# Patient Record
Sex: FEMALE | Race: WHITE | Employment: FULL TIME | URBAN - METROPOLITAN AREA
[De-identification: names, ages, dates, MRNs, and addresses within clinical notes are randomized per-mention and may not be internally consistent; named-entity substitution may affect disease eponyms.]

---

## 2017-04-27 ENCOUNTER — HOSPITAL ENCOUNTER (OUTPATIENT)
Dept: PREADMISSION TESTING | Age: 47
Discharge: HOME | End: 2017-04-27
Attending: PODIATRIST | Admitting: PODIATRIST

## 2017-04-27 VITALS — HEIGHT: 68 IN | WEIGHT: 230 LBS | BODY MASS INDEX: 34.86 KG/M2

## 2017-04-27 RX ORDER — PROPRANOLOL HYDROCHLORIDE 10 MG/1
10 TABLET ORAL 3 TIMES DAILY
COMMUNITY
End: 2018-09-17 | Stop reason: ALTCHOICE

## 2017-04-27 RX ORDER — CHLORHEXIDINE GLUCONATE 0.12 MG/ML
15 RINSE ORAL 2 TIMES DAILY
Status: CANCELLED | OUTPATIENT
Start: 2017-04-27

## 2017-04-27 RX ORDER — OXYCODONE AND ACETAMINOPHEN 10; 325 MG/1; MG/1
1 TABLET ORAL EVERY 4 HOURS PRN
COMMUNITY
End: 2017-05-03 | Stop reason: HOSPADM

## 2017-05-03 ENCOUNTER — HOSPITAL ENCOUNTER (OUTPATIENT)
Dept: SURGERY | Age: 47
Discharge: HOME | End: 2017-05-03
Admitting: PODIATRIST

## 2017-05-03 VITALS
HEIGHT: 68 IN | BODY MASS INDEX: 34.86 KG/M2 | SYSTOLIC BLOOD PRESSURE: 126 MMHG | WEIGHT: 230 LBS | DIASTOLIC BLOOD PRESSURE: 81 MMHG | TEMPERATURE: 96.7 F | OXYGEN SATURATION: 95 % | HEART RATE: 60 BPM | RESPIRATION RATE: 16 BRPM

## 2017-05-03 DIAGNOSIS — G89.18 POST-OP PAIN: Primary | ICD-10-CM

## 2017-05-03 RX ORDER — SODIUM CHLORIDE 0.9 % (FLUSH) 0.9 %
10 SYRINGE (ML) INJECTION PRN
Status: DISCONTINUED | OUTPATIENT
Start: 2017-05-03 | End: 2017-05-04 | Stop reason: HOSPADM

## 2017-05-03 RX ORDER — MEPERIDINE HYDROCHLORIDE 25 MG/ML
12.5 INJECTION INTRAMUSCULAR; INTRAVENOUS; SUBCUTANEOUS EVERY 5 MIN PRN
Status: DISCONTINUED | OUTPATIENT
Start: 2017-05-03 | End: 2017-05-04 | Stop reason: HOSPADM

## 2017-05-03 RX ORDER — LABETALOL HYDROCHLORIDE 5 MG/ML
5 INJECTION, SOLUTION INTRAVENOUS EVERY 10 MIN PRN
Status: DISCONTINUED | OUTPATIENT
Start: 2017-05-03 | End: 2017-05-04 | Stop reason: HOSPADM

## 2017-05-03 RX ORDER — KETOROLAC TROMETHAMINE 30 MG/ML
30 INJECTION, SOLUTION INTRAMUSCULAR; INTRAVENOUS ONCE
Status: COMPLETED | OUTPATIENT
Start: 2017-05-03 | End: 2017-05-03

## 2017-05-03 RX ORDER — ONDANSETRON 2 MG/ML
4 INJECTION INTRAMUSCULAR; INTRAVENOUS
Status: ACTIVE | OUTPATIENT
Start: 2017-05-03 | End: 2017-05-03

## 2017-05-03 RX ORDER — OXYCODONE HYDROCHLORIDE 5 MG/1
10 TABLET ORAL ONCE
Status: COMPLETED | OUTPATIENT
Start: 2017-05-03 | End: 2017-05-03

## 2017-05-03 RX ORDER — FENTANYL CITRATE 50 UG/ML
100 INJECTION, SOLUTION INTRAMUSCULAR; INTRAVENOUS ONCE
Status: COMPLETED | OUTPATIENT
Start: 2017-05-03 | End: 2017-05-03

## 2017-05-03 RX ORDER — DEXAMETHASONE SODIUM PHOSPHATE 4 MG/ML
10 INJECTION, SOLUTION INTRA-ARTICULAR; INTRALESIONAL; INTRAMUSCULAR; INTRAVENOUS; SOFT TISSUE ONCE
Status: COMPLETED | OUTPATIENT
Start: 2017-05-03 | End: 2017-05-03

## 2017-05-03 RX ORDER — GLYCOPYRROLATE 0.2 MG/ML
0.1 INJECTION INTRAMUSCULAR; INTRAVENOUS ONCE
Status: COMPLETED | OUTPATIENT
Start: 2017-05-03 | End: 2017-05-03

## 2017-05-03 RX ORDER — PROMETHAZINE HYDROCHLORIDE 25 MG/1
25 TABLET ORAL EVERY 6 HOURS PRN
Qty: 20 TABLET | Refills: 0 | Status: SHIPPED | OUTPATIENT
Start: 2017-05-03 | End: 2017-05-10

## 2017-05-03 RX ORDER — SODIUM CHLORIDE 0.9 % (FLUSH) 0.9 %
10 SYRINGE (ML) INJECTION EVERY 12 HOURS SCHEDULED
Status: DISCONTINUED | OUTPATIENT
Start: 2017-05-03 | End: 2017-05-04 | Stop reason: HOSPADM

## 2017-05-03 RX ORDER — SODIUM CHLORIDE, SODIUM LACTATE, POTASSIUM CHLORIDE, CALCIUM CHLORIDE 600; 310; 30; 20 MG/100ML; MG/100ML; MG/100ML; MG/100ML
INJECTION, SOLUTION INTRAVENOUS CONTINUOUS
Status: DISCONTINUED | OUTPATIENT
Start: 2017-05-03 | End: 2017-05-04 | Stop reason: HOSPADM

## 2017-05-03 RX ORDER — MORPHINE SULFATE 2 MG/ML
2 INJECTION, SOLUTION INTRAMUSCULAR; INTRAVENOUS EVERY 5 MIN PRN
Status: DISCONTINUED | OUTPATIENT
Start: 2017-05-03 | End: 2017-05-04 | Stop reason: HOSPADM

## 2017-05-03 RX ORDER — ROPIVACAINE HYDROCHLORIDE 5 MG/ML
INJECTION, SOLUTION EPIDURAL; INFILTRATION; PERINEURAL
Status: DISPENSED
Start: 2017-05-03 | End: 2017-05-03

## 2017-05-03 RX ORDER — MIDAZOLAM HYDROCHLORIDE 1 MG/ML
2 INJECTION INTRAMUSCULAR; INTRAVENOUS
Status: COMPLETED | OUTPATIENT
Start: 2017-05-03 | End: 2017-05-03

## 2017-05-03 RX ORDER — HYDROCODONE BITARTRATE AND ACETAMINOPHEN 7.5; 325 MG/1; MG/1
1 TABLET ORAL EVERY 4 HOURS PRN
Qty: 42 TABLET | Refills: 0 | Status: SHIPPED | OUTPATIENT
Start: 2017-05-03 | End: 2017-05-10

## 2017-05-03 RX ORDER — ACETAMINOPHEN 325 MG/1
325 TABLET ORAL
Qty: 30 TABLET | Refills: 0 | Status: SHIPPED | OUTPATIENT
Start: 2017-05-03 | End: 2017-11-20

## 2017-05-03 RX ORDER — ONDANSETRON 2 MG/ML
4 INJECTION INTRAMUSCULAR; INTRAVENOUS ONCE
Status: COMPLETED | OUTPATIENT
Start: 2017-05-03 | End: 2017-05-03

## 2017-05-03 RX ORDER — METOCLOPRAMIDE HYDROCHLORIDE 5 MG/ML
10 INJECTION INTRAMUSCULAR; INTRAVENOUS ONCE
Status: COMPLETED | OUTPATIENT
Start: 2017-05-03 | End: 2017-05-03

## 2017-05-03 RX ORDER — FENTANYL CITRATE 50 UG/ML
50 INJECTION, SOLUTION INTRAMUSCULAR; INTRAVENOUS EVERY 5 MIN PRN
Status: DISCONTINUED | OUTPATIENT
Start: 2017-05-03 | End: 2017-05-04 | Stop reason: HOSPADM

## 2017-05-03 RX ADMIN — OXYCODONE HYDROCHLORIDE 10 MG: 5 TABLET ORAL at 10:32

## 2017-05-03 RX ADMIN — Medication 0.5 MG: at 10:04

## 2017-05-03 RX ADMIN — SODIUM CHLORIDE, SODIUM LACTATE, POTASSIUM CHLORIDE, CALCIUM CHLORIDE: 600; 310; 30; 20 INJECTION, SOLUTION INTRAVENOUS at 07:18

## 2017-05-03 RX ADMIN — KETOROLAC TROMETHAMINE 30 MG: 30 INJECTION, SOLUTION INTRAMUSCULAR; INTRAVENOUS at 10:06

## 2017-05-03 RX ADMIN — METOCLOPRAMIDE HYDROCHLORIDE 10 MG: 5 INJECTION INTRAMUSCULAR; INTRAVENOUS at 07:23

## 2017-05-03 RX ADMIN — MIDAZOLAM HYDROCHLORIDE 2 MG: 1 INJECTION INTRAMUSCULAR; INTRAVENOUS at 07:19

## 2017-05-03 RX ADMIN — GLYCOPYRROLATE 0.1 MG: 0.2 INJECTION INTRAMUSCULAR; INTRAVENOUS at 07:20

## 2017-05-03 RX ADMIN — ONDANSETRON 4 MG: 2 INJECTION INTRAMUSCULAR; INTRAVENOUS at 07:23

## 2017-05-03 RX ADMIN — DEXAMETHASONE SODIUM PHOSPHATE 10 MG: 4 INJECTION, SOLUTION INTRA-ARTICULAR; INTRALESIONAL; INTRAMUSCULAR; INTRAVENOUS; SOFT TISSUE at 07:19

## 2017-05-03 RX ADMIN — FENTANYL CITRATE 100 MCG: 50 INJECTION, SOLUTION INTRAMUSCULAR; INTRAVENOUS at 07:20

## 2017-05-03 ASSESSMENT — PAIN DESCRIPTION - DESCRIPTORS
DESCRIPTORS: ACHING
DESCRIPTORS: ACHING;CRUSHING;DISCOMFORT

## 2017-05-03 ASSESSMENT — PAIN SCALES - GENERAL
PAINLEVEL_OUTOF10: 4
PAINLEVEL_OUTOF10: 4
PAINLEVEL_OUTOF10: 6
PAINLEVEL_OUTOF10: 7
PAINLEVEL_OUTOF10: 4
PAINLEVEL_OUTOF10: 6

## 2017-05-03 ASSESSMENT — PAIN DESCRIPTION - PAIN TYPE: TYPE: OTHER (COMMENT)

## 2017-05-03 ASSESSMENT — PAIN DESCRIPTION - FREQUENCY: FREQUENCY: CONTINUOUS

## 2017-05-03 ASSESSMENT — PAIN DESCRIPTION - ORIENTATION: ORIENTATION: LEFT;LOWER;POSTERIOR

## 2017-05-03 ASSESSMENT — PAIN DESCRIPTION - LOCATION: LOCATION: LEG

## 2017-05-03 ASSESSMENT — PAIN - FUNCTIONAL ASSESSMENT: PAIN_FUNCTIONAL_ASSESSMENT: 0-10

## 2017-11-20 RX ORDER — SODIUM CHLORIDE, SODIUM LACTATE, POTASSIUM CHLORIDE, CALCIUM CHLORIDE 600; 310; 30; 20 MG/100ML; MG/100ML; MG/100ML; MG/100ML
INJECTION, SOLUTION INTRAVENOUS CONTINUOUS
Status: DISCONTINUED | OUTPATIENT
Start: 2017-11-20 | End: 2017-11-22 | Stop reason: HOSPADM

## 2017-11-20 RX ORDER — CELECOXIB 200 MG/1
200 CAPSULE ORAL
COMMUNITY
Start: 2017-10-24 | End: 2019-08-12 | Stop reason: ALTCHOICE

## 2017-11-20 RX ORDER — LISINOPRIL 10 MG/1
10 TABLET ORAL
COMMUNITY
Start: 2017-06-29 | End: 2018-09-17 | Stop reason: ALTCHOICE

## 2017-11-20 RX ORDER — OXYCODONE HYDROCHLORIDE AND ACETAMINOPHEN 5; 325 MG/1; MG/1
1 TABLET ORAL
COMMUNITY
Start: 2017-10-24 | End: 2019-08-12 | Stop reason: ALTCHOICE

## 2017-11-20 RX ORDER — AMITRIPTYLINE HYDROCHLORIDE 10 MG/1
10 TABLET, FILM COATED ORAL NIGHTLY
COMMUNITY
End: 2018-09-17 | Stop reason: ALTCHOICE

## 2017-11-20 NOTE — PROGRESS NOTES
[address] [employer]                          Date of Procedure 11-21-17 Time of [id number]    PRIOR TO PROCEDURE DATE:  1. Please follow any guidelines/instructions prior to your procedure as advised by your surgeon. 2. Arrange for someone to drive you home and be with you for the first 24 hours after discharge for your safety after your procedure for which you received sedation. Ensure it is someone we can share information with regarding your discharge. 3. You must contact your surgeon for instructions IF:   You are taking any blood thinners, aspirin, anti-inflammatory or vitamin E.   There is a change in your physical condition such as a cold, fever, rash, cuts, sores or any other infection, especially near your surgical site. 4. Do not drink alcohol the day before or day of your procedure. 5. A Pre-op History and Physical for surgery MUST be completed by your Physician or Urgent Care within 30 days of your procedure date. Please bring a copy with you on the day of your procedure and along with any other testing performed. THE DAY OF YOUR PROCEDURE:  1. Follow instructions for ARRIVAL TIME as DIRECTED BY YOUR SURGEON. If your surgeon does not give you a specific arrival time, please arrive at [phone].    2. Enter the MAIN entrance from [employer] and follow the signs to the free [employer] or [employer] parking (offered free of charge 6am-5pm). 3. Enter the Main Entrance of the hospital (do not enter from the lower level of the parking garage). Upon entrance, check in with the  at the main desk on your left. If no one is available at the desk, proceed into the [facility] Waiting Room and go through the door directly into the [facility]. There is a Check-in desk ACROSS from Room 5 (marked with a sign hanging from the ceiling). The phone number for the surgery center is [phone].     4. Please call [phone] option #2 option #2 if you have not been preregistered yet. On the day of your procedure bring your insurance card and photo ID. You will be registered at your bedside once brought back to your room. 5. DO NOT EAT OR DRINK ANYTHING AFTER MIDNIGHT. 6. MEDICATIONS    Take the following medications with a SMALL sip of water:  prozac   Use your usual dose of inhalers the morning of surgery. BRING your rescue inhaler with you to hospital.    Anesthesia does NOT want you to take insulin the morning of surgery. They will control your blood sugar while you are at the hospital. Please contact your ordering physician for instructions regarding your insulin the night before your procedure. If you have an insulin pump, please keep it set on basal rate. 7. Do not swallow water when brushing teeth. No gum, candy, mints or ice chips. Refrain from smoking or at least decrease the amount. 8. Dress in loose, comfortable clothing appropriate for redressing after your procedure. Do not wear jewelry (including body piercings), make-up (especially NO eye make-up), fingernail polish (NO toenail polish if foot/leg surgery), lotion, powders or metal hairclips. 9. Dentures, glasses, or contacts will need to be removed before your procedure. Bring cases for your glasses, contacts, dentures, or hearing aids to protect them while you are in surgery. 10. If you use a CPAP, please bring it with you on the day of your procedure. 11. We recommend that valuable personal  belongings, such as credit cards, cash, cell phones, e-tablets or jewelry, be left at home during your stay. The hospital will not be responsible for valuables that are not secured in the hospital safe. However, if your insurance requires a co-pay, you may want to bring a method of payment, i.e. Check or credit card, if you wish to pay your co-pay the day of surgery. 12. If you are to stay overnight, you may bring a bag with personal items.  Please have any large items you may need brought in by your family after your arrival to your hospital room. 15. If you have a Living Will or Durable Power of , please bring a copy on the day of your procedure. 15. With your permission, one family member may accompany you while you are being prepared for surgery. Once you are ready, additional family members may join you. HOW WE KEEP YOU SAFE and WORK TO PREVENT SURGICAL SITE INFECTIONS:  1. Health care workers should always check your ID bracelet to verify your name and birth date. You will be asked many times to state your name, date of birth, and allergies. 2. Health care workers should always clean their hands with soap or alcohol gel before providing care to you. It is okay to ask anyone if they cleaned their hands before they touch you. 3. You will be actively involved in verifying the type of procedure you are having and ensuring the correct surgical site. This will be confirmed multiple times prior to your procedure. Do NOT celso your surgery site UNLESS instructed to by your surgeon. 4. Do not shave or wax for 72 hours prior to procedure near your operative site. Shaving with a razor can irritate your skin and make it easier to develop an infection. On the day of your procedure, any hair that needs to be removed near the surgical site will be clipped by a healthcare worker using a special clippers designed to avoid skin irritation. 5. When you are in the operating room, your surgical site will be cleansed with a special soap, and in most cases, you will be given an antibiotic before the surgery begins. AFTER YOUR PROCEDURE:  1. For comfort and safety, arrange to have someone at home with you for the first 24 hours after discharge. 2. You and your family will be given written instructions about your diet, activity, dressing care, medications, and return visits. 3. Always clean your hands before and after caring for your wound.  Do not let your family touch your surgery site without cleaning their hands. 4. Mild nausea, headache, muscle aches, sore throat, or fatigue may occur after anesthesia. Should any of these symptoms become severe, or should you notice any signs of infection, you should call your surgeon. 5. Narcotic pain medications can cause significant constipation. You may want to add a stool softener to your postoperative medication schedule or speak to your surgeon on how best to manage this SIDE EFFECT. SPECIAL INSTRUCTIONS     Thank you for allowing us to care for you. We strive to exceed your expectations in the delivery of care and service provided to you and your family. If you need to contact us for any reason, please call us at [phone]    Instructions reviewed with patient during preadmission testing phone interview. Kaitlynn Cancino. 11/20/2017 .1:02 PM      ADDITIONAL EDUCATIONAL INFORMATION REVIEWED PER PHONE WITH YOU AND/OR YOUR FAMILY:  No Bring a urine sample on day of surgery  Yes Pain Goal-Taking Control of Your Pain  Yes FAQs about Surgical Site Infections  Yes Hibiclens® Bathing Instructions / or Other Antibacterial Soap  No Incentive Spirometer Education  No Other

## 2017-11-20 NOTE — PROGRESS NOTES
The following educational items and goals will be achieved upon completion of the patient's Pre-admission testing experience:             Identify the learner who is being assessed for education:  patient                    Ability to Learn:  Exhibits ability to grasp concepts and respond to questions: High  Ready to Learn: Yes  calm   Preferred Method of Learning:  verbal  Barriers to Learning: Verbalizes interest  Special Considerations due to cultural, Episcopalian, spiritual beliefs:  No  Language:  English  :  Dora Small  [x] Appropriate evaluation / integration of data as delineated by ASPAN Standards of Perianesthesia Nursing Practice    Pain scale and pain management   [x]Patient verbalizes understanding of pain scale and pain management  [x]Pre-operative determination of patients anticipated Post-Operative pain goal:   4 of 10 on 10 point scale post op goal  [] Other     Medication(s) - Compliance with preop medication instructions  [x] Patient verbalizes understanding of preop medications (see [facility] Presurgical Instructions)    Instructions, Pre op                                                                                            [x] Patient verbalizes understanding of presurgical instructions as reviewed with phone interview nurse or in-person nurse review    Fall Risk Potential, Preoperatively                                                                                   [x]No preoperative risk identified  []Preop risk identified:                    []Sensory deficit        []Motor deficit        []Balance problem        []Home medication        []Uses assistive device                    []History of a Fall within the last 30 days    Goal(s) for fall prevention:  [x]Prevent fall or injury by requesting assistance with activities of daily living  [x]Patient / Significant other verbalizes understanding the need to call for

## 2017-11-21 ENCOUNTER — HOSPITAL ENCOUNTER (OUTPATIENT)
Dept: SURGERY | Age: 47
Discharge: HOME | End: 2017-11-21
Admitting: PODIATRIST

## 2017-11-21 VITALS
HEIGHT: 68 IN | SYSTOLIC BLOOD PRESSURE: 109 MMHG | OXYGEN SATURATION: 92 % | BODY MASS INDEX: 37.28 KG/M2 | WEIGHT: 246 LBS | TEMPERATURE: 97.9 F | HEART RATE: 78 BPM | RESPIRATION RATE: 16 BRPM | DIASTOLIC BLOOD PRESSURE: 73 MMHG

## 2017-11-21 DIAGNOSIS — G57.62 LESION OF LEFT PLANTAR NERVE: ICD-10-CM

## 2017-11-21 RX ORDER — GABAPENTIN 300 MG/1
300 CAPSULE ORAL NIGHTLY PRN
Qty: 30 CAPSULE | Refills: 0 | Status: SHIPPED | OUTPATIENT
Start: 2017-11-21 | End: 2019-08-12 | Stop reason: ALTCHOICE

## 2017-11-21 RX ORDER — SODIUM CHLORIDE 0.9 % (FLUSH) 0.9 %
10 SYRINGE (ML) INJECTION EVERY 12 HOURS SCHEDULED
Status: DISCONTINUED | OUTPATIENT
Start: 2017-11-21 | End: 2017-11-22 | Stop reason: HOSPADM

## 2017-11-21 RX ORDER — OXYCODONE AND ACETAMINOPHEN 10; 325 MG/1; MG/1
1 TABLET ORAL EVERY 6 HOURS PRN
Qty: 28 TABLET | Refills: 0 | Status: SHIPPED | OUTPATIENT
Start: 2017-11-21 | End: 2017-11-28

## 2017-11-21 RX ORDER — DEXAMETHASONE SODIUM PHOSPHATE 4 MG/ML
4 INJECTION, SOLUTION INTRA-ARTICULAR; INTRALESIONAL; INTRAMUSCULAR; INTRAVENOUS; SOFT TISSUE ONCE
Status: COMPLETED | OUTPATIENT
Start: 2017-11-21 | End: 2017-11-21

## 2017-11-21 RX ORDER — FENTANYL CITRATE 50 UG/ML
25 INJECTION, SOLUTION INTRAMUSCULAR; INTRAVENOUS EVERY 5 MIN PRN
Status: DISCONTINUED | OUTPATIENT
Start: 2017-11-21 | End: 2017-11-22 | Stop reason: HOSPADM

## 2017-11-21 RX ORDER — ONDANSETRON 2 MG/ML
4 INJECTION INTRAMUSCULAR; INTRAVENOUS
Status: ACTIVE | OUTPATIENT
Start: 2017-11-21 | End: 2017-11-21

## 2017-11-21 RX ORDER — GLYCOPYRROLATE 0.2 MG/ML
0.2 INJECTION INTRAMUSCULAR; INTRAVENOUS ONCE
Status: COMPLETED | OUTPATIENT
Start: 2017-11-21 | End: 2017-11-21

## 2017-11-21 RX ORDER — SODIUM CHLORIDE 0.9 % (FLUSH) 0.9 %
10 SYRINGE (ML) INJECTION PRN
Status: DISCONTINUED | OUTPATIENT
Start: 2017-11-21 | End: 2017-11-22 | Stop reason: HOSPADM

## 2017-11-21 RX ORDER — ONDANSETRON 2 MG/ML
4 INJECTION INTRAMUSCULAR; INTRAVENOUS ONCE
Status: COMPLETED | OUTPATIENT
Start: 2017-11-21 | End: 2017-11-21

## 2017-11-21 RX ORDER — PROMETHAZINE HYDROCHLORIDE 25 MG/1
25 TABLET ORAL EVERY 6 HOURS PRN
Qty: 20 TABLET | Refills: 0 | Status: SHIPPED | OUTPATIENT
Start: 2017-11-21 | End: 2017-11-28

## 2017-11-21 RX ORDER — LIDOCAINE HYDROCHLORIDE 10 MG/ML
1 INJECTION, SOLUTION EPIDURAL; INFILTRATION; INTRACAUDAL; PERINEURAL
Status: ACTIVE | OUTPATIENT
Start: 2017-11-21 | End: 2017-11-21

## 2017-11-21 RX ORDER — SODIUM CHLORIDE, SODIUM LACTATE, POTASSIUM CHLORIDE, CALCIUM CHLORIDE 600; 310; 30; 20 MG/100ML; MG/100ML; MG/100ML; MG/100ML
INJECTION, SOLUTION INTRAVENOUS CONTINUOUS
Status: DISCONTINUED | OUTPATIENT
Start: 2017-11-21 | End: 2017-11-22 | Stop reason: HOSPADM

## 2017-11-21 RX ORDER — LABETALOL HYDROCHLORIDE 5 MG/ML
5 INJECTION, SOLUTION INTRAVENOUS EVERY 10 MIN PRN
Status: DISCONTINUED | OUTPATIENT
Start: 2017-11-21 | End: 2017-11-22 | Stop reason: HOSPADM

## 2017-11-21 RX ORDER — HYDRALAZINE HYDROCHLORIDE 20 MG/ML
5 INJECTION INTRAMUSCULAR; INTRAVENOUS EVERY 5 MIN PRN
Status: DISCONTINUED | OUTPATIENT
Start: 2017-11-21 | End: 2017-11-22 | Stop reason: HOSPADM

## 2017-11-21 RX ORDER — ENALAPRILAT 2.5 MG/2ML
1.25 INJECTION INTRAVENOUS
Status: ACTIVE | OUTPATIENT
Start: 2017-11-21 | End: 2017-11-21

## 2017-11-21 RX ADMIN — GLYCOPYRROLATE 0.2 MG: 0.2 INJECTION INTRAMUSCULAR; INTRAVENOUS at 12:27

## 2017-11-21 RX ADMIN — DEXAMETHASONE SODIUM PHOSPHATE 4 MG: 4 INJECTION, SOLUTION INTRA-ARTICULAR; INTRALESIONAL; INTRAMUSCULAR; INTRAVENOUS; SOFT TISSUE at 12:24

## 2017-11-21 RX ADMIN — Medication 0.5 MG: at 16:20

## 2017-11-21 RX ADMIN — ONDANSETRON 4 MG: 2 INJECTION INTRAMUSCULAR; INTRAVENOUS at 12:21

## 2017-11-21 RX ADMIN — Medication 0.5 MG: at 15:56

## 2017-11-21 RX ADMIN — SODIUM CHLORIDE, SODIUM LACTATE, POTASSIUM CHLORIDE, CALCIUM CHLORIDE: 600; 310; 30; 20 INJECTION, SOLUTION INTRAVENOUS at 11:01

## 2017-11-21 RX ADMIN — Medication 0.5 MG: at 16:30

## 2017-11-21 RX ADMIN — Medication 0.5 MG: at 15:43

## 2017-11-21 ASSESSMENT — PAIN SCALES - GENERAL
PAINLEVEL_OUTOF10: 7
PAINLEVEL_OUTOF10: 7
PAINLEVEL_OUTOF10: 4
PAINLEVEL_OUTOF10: 7
PAINLEVEL_OUTOF10: 4
PAINLEVEL_OUTOF10: 7

## 2017-11-21 ASSESSMENT — PAIN DESCRIPTION - FREQUENCY
FREQUENCY: CONTINUOUS

## 2017-11-21 ASSESSMENT — PAIN DESCRIPTION - LOCATION
LOCATION: FOOT

## 2017-11-21 ASSESSMENT — PAIN DESCRIPTION - DESCRIPTORS
DESCRIPTORS: SHARP
DESCRIPTORS: DISCOMFORT
DESCRIPTORS: ACHING;DULL

## 2017-11-21 ASSESSMENT — PAIN DESCRIPTION - ORIENTATION
ORIENTATION: LEFT

## 2017-11-21 ASSESSMENT — PAIN DESCRIPTION - PROGRESSION: CLINICAL_PROGRESSION: GRADUALLY IMPROVING

## 2017-11-21 ASSESSMENT — PAIN DESCRIPTION - PAIN TYPE
TYPE: SURGICAL PAIN
TYPE: ACUTE PAIN;SURGICAL PAIN

## 2017-11-21 ASSESSMENT — PAIN - FUNCTIONAL ASSESSMENT: PAIN_FUNCTIONAL_ASSESSMENT: 0-10

## 2017-11-21 NOTE — H&P
William Young    [id number]    [facility] Same Day Surgery Update H & P  Department of General Surgery   Surgical Service   Physician Assistant Pre-operative History and Physical  Last H & P within the last 30 days. 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 Past Medical History:        Diagnosis Date    Arthritis     Depression     Elbow injury     CASTED     Fractures     ELBOW AND WRIST    Headache     Hypertension     Wrist fracture     AS CHILD     Past Surgical History:        Procedure Laterality Date    BUNIONECTOMY      CARPAL TUNNEL RELEASE      bilateral    CERVICAL FUSION      X3  NECK PROCEDURE LAST    PROCEDURE  TORN JUGULAR    CHOLECYSTECTOMY      CYST REMOVAL      left wrist x 2    HYSTERECTOMY      OTHER SURGICAL HISTORY Left 05/03/2017    Gastroc recession left    SALIVARY GLAND SURGERY      removal of benign cyst     Past Social History:  Social History     Social History    Marital status:      Spouse name: N/A    Number of children: N/A    Years of education: N/A     Occupational History    teacher      Social History Main Topics    Smoking status: Never Smoker    Smokeless tobacco: Never Used    Alcohol use No    Drug use: No    Sexual activity: Not Asked     Other Topics Concern    None     Social History Narrative    None         Medications Prior to Admission:      Prior to Admission medications    Medication Sig Start Date End Date Taking? Authorizing Provider   celecoxib (CELEBREX) 200 MG capsule Take 200 mg by mouth 10/24/17  Yes Historical Provider, MD   lisinopril (PRINIVIL;ZESTRIL) 10 MG tablet Take 10 mg by mouth 6/29/17  Yes Historical Provider, MD   oxyCODONE-acetaminophen (PERCOCET) 5-325 MG per tablet Take 1 tablet by mouth .  10/24/17  Yes Historical Provider, MD   amitriptyline (ELAVIL) 10 MG tablet Take 10 mg by mouth nightly   Yes Historical Provider, MD   gabapentin (NEURONTIN) 600 MG tablet Take 600 mg by mouth 3 times daily   Yes Historical Provider, MD   fluoxetine (PROZAC) 20 MG capsule Take 40 mg by mouth daily. Yes Historical Provider, MD   propranolol (INDERAL) 10 MG tablet Take 10 mg by mouth 3 times daily    Historical Provider, MD   tiZANidine (ZANAFLEX) 4 MG tablet Take 1/2 tablet Po In the morning and 1 tablet at night as needed for muscles spasms 9/26/16   Beka Maldonado MD   guaiFENesin (MUCINEX) 600 MG SR tablet Take 1,200 mg by mouth 2 times daily    Historical Provider, MD         Allergies:  Robaxin [methocarbamol]; Macrobid [nitrofurantoin monohyd macro]; Morphine; Levofloxacin; and Shellfish-derived products    PHYSICAL EXAM:      BP (!) 158/96   Pulse 71   Temp 98.4 °F (36.9 °C) (Oral)   Resp 18   Ht 5' 8\" (1.727 m)   Wt 246 lb (111.6 kg)   SpO2 95%   BMI 37.40 kg/m²      Heart:  regular rate and rhythm, no murmur    Lungs:  No increased work of breathing, good air exchange, clear to auscultation bilaterally, no crackles or wheezing    Abdomen:  soft, non-distended, non-tender, no rebound tenderness or guarding, normal active bowel sounds and no masses palpated    ASSESSMENT AND PLAN:    1. Patient seen and focused exam done today- no new changes since last physical exam on 11-    2. Access to ancillary services are available per request of the provider.     Giancarlo Morel     11/21/2017

## 2017-11-21 NOTE — OP NOTE
OPERATIVE REPORT    Cheng Vann  YOB: 1970  [id number]    Pre-operative Diagnosis:   1. Metatarsalgia left foot   2. 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 Weil osteotomy 3rd metatarsal left  2. Excision of neuroma 2nd interspace left  3. Excision of neuroma 3rd interspace left     Anesthesia: General and Local    Surgeons/Assistants: Hannah Posadas DPM, Rosi Faustin PGY-1, [facility]    Estimated Blood Loss: less than 50     Complications: None    Specimens: Was Obtained: neuroma 2nd interspace and neuroma 3rd interspace sent to pathology. Findings: See Op Note    Materials: actishield amniotic graft, 12mm snap off screw, 4-0 vicryl, 4-0 nylon         INDICATIONS FOR PROCEDURE: This patient has signs and symptoms clinically consistent with the above mentioned preoperative diagnosis. Having failed conservative treatment, it was determined that the patient would benefit from surgical intervention. All potential risks, benefits, and complications were discussed with the patient prior to the scheduling of surgery. The patient wished to proceed with surgery, and informed written consent was obtained. DETAILS OF PROCEDURE: The patient was brought from the pre-operative area and placed on the operating table in the supine position. A pneumatic ankle tourniquet was placed around the patient's well-padded left ankle. At this time a time out was taken to identify the patient, allergies, time of prophylactic antibiotic administration, and operative side and site. A local anesthetic block consisting was then injected proximal to the incision site. The left  lower extremity was then scrubbed, prepped, and draped in the usual sterile fashion. An Esmarch bandage was then utilized to exsanguinate the patient's left lower extremity. The tourniquet was then inflated to 250 mmHg.     Procedure #1:   Attention was directed to the dorsal aspect of the left foot where a 4 cm curvilinear, longitudinal incision line was drawn with a sterile marking pen over the dorsal aspect of the 3rd metatarsal and centering over the metarsophalangeal joint. The incision was deepened through the subcutaneous layer with care being taken to identify and retract all vital neurovascular structures. All venous tributaries were electrocoagulated as encountered. Sharp and blunt dissection continued to the level of the metatarsophalangeal joint. Using a #15 blade, the dorsal, medial and lateral aspects of the metatarsophalangeal joint capsule were transected, thereby releasing the capsule. A weightbearing attitude of the joint was then simulated utilizing the Kelikian push-up test, performed by placing a dorsiflexory force on the plantar aspect of the metatarsal head. The previously noted dorsal contract was further reduced. Attention was then directed to the notably elongated metatarsal, and attention was directed to the osteotomy. Using a #15 blade, a capsular and periosteal incision was then made linearly over the 3rd metatarsal head. The capsule and periosteal structures were meticulously reflected both dorsally, medially and laterally until excellent soft tissue exposure had been achieved. Attention was then directed towards the creation of the osteotomy. Beginning approximately 2 mm into the articular cartilage from dorsodistal to proximoplantar, a through and through osteotomy of the second metatarsal was created, parallel to the weight-bearing surface. Upon completion of the osteotomy, the head of the metatarsal was transposed approximately 3 mm proximally. The remaining articular cartilage overhanging transversely following the osteotomy was resected utilizing a bone rongeur. The osteotomy was temporarily fixated utilizing a 0.045in K wire. C-arm fluoroscopy was utilized to evaluate the alignment of the osteotomy.   Excellent hyperemic response noted to all digits of the left foot      The patient tolerated the procedure and anesthesia well. The patient was transported from the OR to the PACU with vital signs stable and vascular status intact to all digits of the left foot. Following a short period of post-operative monitoring the patient is to be discharged. The patient will need to follow up with Dr. Mukesh Bhatti at His private office in 5-7 days. Call the office for an appointment and if any complications occur.       Adam Miner, PGY-1  Pager #: [phone]

## 2017-11-21 NOTE — ANESTHESIA PRE-OP
Department of Anesthesiology  Preprocedure Note       Name:  Carissa Vásquez   Age:  49 y.o.  :  1970                                          MRN:  [id number]         Date:  2017      Surgeon: DR Fernando Bautista  Height: 5' 8\" (172.7 cm)  Weight: 246 lb (111.6 kg)  PAST SURGICAL HISTORY:  has a past surgical history that includes Hysterectomy; cervical fusion; Cholecystectomy; Bunionectomy; Carpal tunnel release; cyst removal; Salivary gland surgery; and other surgical history (Left, 2017). Procedure: 3rd metatarsal osteotomy and neuroma excision    Medications prior to admission:   Prior to Admission medications    Medication Sig Start Date End Date Taking? Authorizing Provider   celecoxib (CELEBREX) 200 MG capsule Take 200 mg by mouth 10/24/17  Yes Historical Provider, MD   lisinopril (PRINIVIL;ZESTRIL) 10 MG tablet Take 10 mg by mouth 17  Yes Historical Provider, MD   oxyCODONE-acetaminophen (PERCOCET) 5-325 MG per tablet Take 1 tablet by mouth . 10/24/17  Yes Historical Provider, MD   amitriptyline (ELAVIL) 10 MG tablet Take 10 mg by mouth nightly   Yes Historical Provider, MD   gabapentin (NEURONTIN) 600 MG tablet Take 600 mg by mouth 3 times daily   Yes Historical Provider, MD   fluoxetine (PROZAC) 20 MG capsule Take 40 mg by mouth daily.    Yes Historical Provider, MD   propranolol (INDERAL) 10 MG tablet Take 10 mg by mouth 3 times daily    Historical Provider, MD   tiZANidine (ZANAFLEX) 4 MG tablet Take 1/2 tablet Po In the morning and 1 tablet at night as needed for muscles spasms 16   Romana Rakes, MD   guaiFENesin (MUCINEX) 600 MG SR tablet Take 1,200 mg by mouth 2 times daily    Historical Provider, MD       Current medications:    Current Outpatient Prescriptions   Medication Sig Dispense Refill    celecoxib (CELEBREX) 200 MG capsule Take 200 mg by mouth      lisinopril (PRINIVIL;ZESTRIL) 10 MG tablet Take 10 mg by mouth      oxyCODONE-acetaminophen (PERCOCET) 5-325 MG per Diagnosis Code    Elbow contusion S50.00XA    Elbow pain M25.529    Sprain and strain of unspecified site of shoulder and upper arm V11.862M, S46.919A    Chronic pain syndrome G89.4    Failed neck syndrome M96.1    Primary insomnia F51.01    Fibromyalgia M79.7    Mood disorder (HCC) F39       Past Medical History:        Diagnosis Date    Arthritis     Depression     Elbow injury     CASTED     Fractures     ELBOW AND WRIST    Headache     Hypertension     Wrist fracture     AS CHILD       Past Surgical History:        Procedure Laterality Date    BUNIONECTOMY      CARPAL TUNNEL RELEASE      bilateral    CERVICAL FUSION      X3  NECK PROCEDURE LAST    PROCEDURE  TORN JUGULAR    CHOLECYSTECTOMY      CYST REMOVAL      left wrist x 2    HYSTERECTOMY      OTHER SURGICAL HISTORY Left 05/03/2017    Gastroc recession left    SALIVARY GLAND SURGERY      removal of benign cyst       Social History:    Social History   Substance Use Topics    Smoking status: Never Smoker    Smokeless tobacco: Never Used    Alcohol use No                                Counseling given: Not Answered      Vital Signs (Current):   Vitals:    11/20/17 1235 11/21/17 1017 11/21/17 1031   BP:  (!) 149/106 (!) 158/96   Pulse:  71    Resp:  18    Temp:  98.4 °F (36.9 °C)    TempSrc:  Oral    SpO2:  95%    Weight: 246 lb (111.6 kg) 246 lb (111.6 kg) 246 lb (111.6 kg)   Height: 5' 8\" (1.727 m) 5' 8\" (1.727 m) 5' 8\" (1.727 m)                                              BP Readings from Last 3 Encounters:   11/21/17 (!) 158/96   05/03/17 126/81   09/26/16 (!) 132/92       NPO Status: Time of last liquid consumption: 0600 (sip of water )                        Time of last solid consumption: 2100                        Date of last liquid consumption: 11/21/17                        Date of last solid food consumption: 11/20/17    BMI:   Wt Readings from Last 3 Encounters:   11/21/17 246 lb (111.6 kg)   05/03/17 230 lb (104.3

## 2017-11-21 NOTE — PROGRESS NOTES
device  []Wound Support Device  [] Crutches   []Drain    [] Walker   [x]Other:  Ice pack, Surgical shoe  [] Inpatient / significant other understands the plan for transfer to the inpatient unit

## 2017-11-21 NOTE — BRIEF OP NOTE
Brief Postoperative Note    Thong Arzola  YOB: 1970  [id number]    Pre-operative Diagnosis:   1. Metatarsalgia left foot   2. 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 Weil osteotomy 3rd metatarsal left  2. Excision of neuroma 2nd interspace left  3. Excision of neuroma 3rd interspace left     Anesthesia: General and Local    Surgeons/Assistants: Chanda WHITAKERM, Hever Zuniga PGY-1, [facility] PGY-3    Estimated Blood Loss: less than 50     Complications: None    Specimens: Was Obtained: neuroma 2nd interspace and neuroma 3rd interspace sent to pathology.      Findings: See Op Note    Materials: actishield amniotic graft, 12mm snap off screw, 4-0 vicryl, 4-0 nylon     Electronically signed by [facility] on 11/21/2017 at 3:14 PM

## 2017-11-21 NOTE — PROGRESS NOTES
PACU Transfer to [facility]    Vitals:    11/21/17 1645   BP: 128/69   Pulse: 78   Resp: 18   Temp: 99 °F (37.2 °C)   SpO2: 93%         Intake/Output Summary (Last 24 hours) at 11/21/17 1658  Last data filed at 11/21/17 1520   Gross per 24 hour   Intake              100 ml   Output               10 ml   Net               90 ml       Pain assessment:  present - adequately treated  Pain Level: 4    Patient transferred to care of [facility] RN. Surgical shoe placed on foot. No further changes.  called at approximately [phone] to pick patient up. Prescription in pharmacy to be picked up per [facility] staff.      11/21/2017 4:58 PM

## 2018-09-25 ENCOUNTER — ANESTHESIA EVENT (OUTPATIENT)
Dept: OPERATING ROOM | Age: 48
End: 2018-09-25
Payer: COMMERCIAL

## 2018-09-25 NOTE — ANESTHESIA PRE PROCEDURE
Department of Anesthesiology  Preprocedure Note       Name:  Jenae Gaona   Age:  51 y.o.  :  1970                                          MRN:  [id number]         Date:  2018      Surgeon: Madhuri Dyson):  Amada Moura DPM    Procedure: Procedure(s):  REMOVAL OF PAINFUL RETAINED HARDWARE LEFT FOOT    Medications prior to admission:   Prior to Admission medications    Medication Sig Start Date End Date Taking? Authorizing Provider   gabapentin (NEURONTIN) 300 MG capsule Take 1 capsule by mouth nightly as needed (nerve pain) 17   Robe 52, DPM   celecoxib (CELEBREX) 200 MG capsule Take 200 mg by mouth 10/24/17   Historical Provider, MD   oxyCODONE-acetaminophen (PERCOCET) 5-325 MG per tablet Take 1 tablet by mouth . 10/24/17   Historical Provider, MD   gabapentin (NEURONTIN) 600 MG tablet Take 600 mg by mouth 3 times daily    Historical Provider, MD   guaiFENesin (MUCINEX) 600 MG SR tablet Take 1,200 mg by mouth 2 times daily    Historical Provider, MD   fluoxetine (PROZAC) 20 MG capsule Take 40 mg by mouth daily. Historical Provider, MD       Current medications:    No current facility-administered medications for this encounter. Current Outpatient Prescriptions   Medication Sig Dispense Refill    gabapentin (NEURONTIN) 300 MG capsule Take 1 capsule by mouth nightly as needed (nerve pain) 30 capsule 0    celecoxib (CELEBREX) 200 MG capsule Take 200 mg by mouth      oxyCODONE-acetaminophen (PERCOCET) 5-325 MG per tablet Take 1 tablet by mouth .  gabapentin (NEURONTIN) 600 MG tablet Take 600 mg by mouth 3 times daily      guaiFENesin (MUCINEX) 600 MG SR tablet Take 1,200 mg by mouth 2 times daily      fluoxetine (PROZAC) 20 MG capsule Take 40 mg by mouth daily. Allergies:     Allergies   Allergen Reactions    Robaxin [Methocarbamol] Nausea And Vomiting     SEVERE    Macrobid [Nitrofurantoin Monohyd Macro] Nausea And Vomiting    Morphine      HIVES    Levofloxacin

## 2018-09-26 ENCOUNTER — HOSPITAL ENCOUNTER (OUTPATIENT)
Age: 48
Setting detail: OUTPATIENT SURGERY
Discharge: HOME | End: 2018-09-26
Attending: PODIATRIST | Admitting: PODIATRIST
Payer: COMMERCIAL

## 2018-09-26 ENCOUNTER — ANESTHESIA (OUTPATIENT)
Dept: OPERATING ROOM | Age: 48
End: 2018-09-26
Payer: COMMERCIAL

## 2018-09-26 VITALS
BODY MASS INDEX: 33.04 KG/M2 | WEIGHT: 218 LBS | SYSTOLIC BLOOD PRESSURE: 142 MMHG | DIASTOLIC BLOOD PRESSURE: 83 MMHG | OXYGEN SATURATION: 100 % | TEMPERATURE: 97.8 F | HEIGHT: 68 IN | RESPIRATION RATE: 14 BRPM | HEART RATE: 71 BPM

## 2018-09-26 VITALS
DIASTOLIC BLOOD PRESSURE: 58 MMHG | OXYGEN SATURATION: 98 % | RESPIRATION RATE: 1 BRPM | SYSTOLIC BLOOD PRESSURE: 94 MMHG

## 2018-09-26 PROCEDURE — 3700000001 HC ADD 15 MINUTES (ANESTHESIA): Performed by: PODIATRIST

## 2018-09-26 PROCEDURE — 3600000003 HC SURGERY LEVEL 3 BASE: Performed by: PODIATRIST

## 2018-09-26 PROCEDURE — 7100000000 HC PACU RECOVERY - FIRST 15 MIN: Performed by: PODIATRIST

## 2018-09-26 PROCEDURE — 2580000003 HC RX 258: Performed by: ANESTHESIOLOGY

## 2018-09-26 PROCEDURE — 2500000003 HC RX 250 WO HCPCS: Performed by: ANESTHESIOLOGY

## 2018-09-26 PROCEDURE — 3600000013 HC SURGERY LEVEL 3 ADDTL 15MIN: Performed by: PODIATRIST

## 2018-09-26 PROCEDURE — 7100000010 HC PHASE II RECOVERY - FIRST 15 MIN: Performed by: PODIATRIST

## 2018-09-26 PROCEDURE — 6360000002 HC RX W HCPCS: Performed by: NURSE ANESTHETIST, CERTIFIED REGISTERED

## 2018-09-26 PROCEDURE — 6360000002 HC RX W HCPCS

## 2018-09-26 PROCEDURE — 6370000000 HC RX 637 (ALT 250 FOR IP): Performed by: ANESTHESIOLOGY

## 2018-09-26 PROCEDURE — 2500000003 HC RX 250 WO HCPCS: Performed by: PODIATRIST

## 2018-09-26 PROCEDURE — 7100000001 HC PACU RECOVERY - ADDTL 15 MIN: Performed by: PODIATRIST

## 2018-09-26 PROCEDURE — 2709999900 HC NON-CHARGEABLE SUPPLY: Performed by: PODIATRIST

## 2018-09-26 PROCEDURE — 3700000000 HC ANESTHESIA ATTENDED CARE: Performed by: PODIATRIST

## 2018-09-26 PROCEDURE — 7100000011 HC PHASE II RECOVERY - ADDTL 15 MIN: Performed by: PODIATRIST

## 2018-09-26 RX ORDER — PROMETHAZINE HYDROCHLORIDE 25 MG/ML
6.25 INJECTION, SOLUTION INTRAMUSCULAR; INTRAVENOUS
Status: DISCONTINUED | OUTPATIENT
Start: 2018-09-26 | End: 2018-09-26 | Stop reason: HOSPADM

## 2018-09-26 RX ORDER — DIPHENHYDRAMINE HYDROCHLORIDE 50 MG/ML
12.5 INJECTION INTRAMUSCULAR; INTRAVENOUS
Status: DISCONTINUED | OUTPATIENT
Start: 2018-09-26 | End: 2018-09-26 | Stop reason: HOSPADM

## 2018-09-26 RX ORDER — LABETALOL HYDROCHLORIDE 5 MG/ML
5 INJECTION, SOLUTION INTRAVENOUS EVERY 10 MIN PRN
Status: DISCONTINUED | OUTPATIENT
Start: 2018-09-26 | End: 2018-09-26 | Stop reason: HOSPADM

## 2018-09-26 RX ORDER — OXYCODONE HYDROCHLORIDE AND ACETAMINOPHEN 5; 325 MG/1; MG/1
2 TABLET ORAL PRN
Status: COMPLETED | OUTPATIENT
Start: 2018-09-26 | End: 2018-09-26

## 2018-09-26 RX ORDER — ONDANSETRON 2 MG/ML
INJECTION INTRAMUSCULAR; INTRAVENOUS PRN
Status: DISCONTINUED | OUTPATIENT
Start: 2018-09-26 | End: 2018-09-26 | Stop reason: SDUPTHER

## 2018-09-26 RX ORDER — PROPOFOL 10 MG/ML
INJECTION, EMULSION INTRAVENOUS CONTINUOUS PRN
Status: DISCONTINUED | OUTPATIENT
Start: 2018-09-26 | End: 2018-09-26 | Stop reason: SDUPTHER

## 2018-09-26 RX ORDER — SODIUM CHLORIDE 0.9 % (FLUSH) 0.9 %
10 SYRINGE (ML) INJECTION EVERY 12 HOURS SCHEDULED
Status: DISCONTINUED | OUTPATIENT
Start: 2018-09-26 | End: 2018-09-26 | Stop reason: HOSPADM

## 2018-09-26 RX ORDER — MIDAZOLAM HYDROCHLORIDE 1 MG/ML
INJECTION INTRAMUSCULAR; INTRAVENOUS PRN
Status: DISCONTINUED | OUTPATIENT
Start: 2018-09-26 | End: 2018-09-26 | Stop reason: SDUPTHER

## 2018-09-26 RX ORDER — OXYCODONE HYDROCHLORIDE AND ACETAMINOPHEN 5; 325 MG/1; MG/1
TABLET ORAL
Status: DISCONTINUED
Start: 2018-09-26 | End: 2018-09-26 | Stop reason: HOSPADM

## 2018-09-26 RX ORDER — HYDRALAZINE HYDROCHLORIDE 20 MG/ML
5 INJECTION INTRAMUSCULAR; INTRAVENOUS EVERY 10 MIN PRN
Status: DISCONTINUED | OUTPATIENT
Start: 2018-09-26 | End: 2018-09-26 | Stop reason: HOSPADM

## 2018-09-26 RX ORDER — MEPERIDINE HYDROCHLORIDE 25 MG/ML
12.5 INJECTION INTRAMUSCULAR; INTRAVENOUS; SUBCUTANEOUS EVERY 5 MIN PRN
Status: DISCONTINUED | OUTPATIENT
Start: 2018-09-26 | End: 2018-09-26 | Stop reason: HOSPADM

## 2018-09-26 RX ORDER — LIDOCAINE HYDROCHLORIDE 20 MG/ML
INJECTION, SOLUTION EPIDURAL; INFILTRATION; INTRACAUDAL; PERINEURAL PRN
Status: DISCONTINUED | OUTPATIENT
Start: 2018-09-26 | End: 2018-09-26 | Stop reason: HOSPADM

## 2018-09-26 RX ORDER — DOXYCYCLINE 100 MG/1
100 TABLET ORAL 2 TIMES DAILY
Qty: 14 TABLET | Refills: 0 | Status: SHIPPED | OUTPATIENT
Start: 2018-09-26 | End: 2018-10-03

## 2018-09-26 RX ORDER — FENTANYL CITRATE 50 UG/ML
INJECTION, SOLUTION INTRAMUSCULAR; INTRAVENOUS PRN
Status: DISCONTINUED | OUTPATIENT
Start: 2018-09-26 | End: 2018-09-26 | Stop reason: SDUPTHER

## 2018-09-26 RX ORDER — ONDANSETRON 2 MG/ML
4 INJECTION INTRAMUSCULAR; INTRAVENOUS PRN
Status: DISCONTINUED | OUTPATIENT
Start: 2018-09-26 | End: 2018-09-26 | Stop reason: HOSPADM

## 2018-09-26 RX ORDER — OXYCODONE HYDROCHLORIDE AND ACETAMINOPHEN 5; 325 MG/1; MG/1
1 TABLET ORAL PRN
Status: COMPLETED | OUTPATIENT
Start: 2018-09-26 | End: 2018-09-26

## 2018-09-26 RX ORDER — BUPIVACAINE HYDROCHLORIDE 5 MG/ML
INJECTION, SOLUTION PERINEURAL PRN
Status: DISCONTINUED | OUTPATIENT
Start: 2018-09-26 | End: 2018-09-26 | Stop reason: HOSPADM

## 2018-09-26 RX ORDER — HYDROMORPHONE HCL 110MG/55ML
0.25 PATIENT CONTROLLED ANALGESIA SYRINGE INTRAVENOUS EVERY 5 MIN PRN
Status: DISCONTINUED | OUTPATIENT
Start: 2018-09-26 | End: 2018-09-26 | Stop reason: HOSPADM

## 2018-09-26 RX ORDER — SODIUM CHLORIDE 0.9 % (FLUSH) 0.9 %
10 SYRINGE (ML) INJECTION PRN
Status: DISCONTINUED | OUTPATIENT
Start: 2018-09-26 | End: 2018-09-26 | Stop reason: HOSPADM

## 2018-09-26 RX ORDER — SODIUM CHLORIDE, SODIUM LACTATE, POTASSIUM CHLORIDE, CALCIUM CHLORIDE 600; 310; 30; 20 MG/100ML; MG/100ML; MG/100ML; MG/100ML
INJECTION, SOLUTION INTRAVENOUS CONTINUOUS
Status: DISCONTINUED | OUTPATIENT
Start: 2018-09-26 | End: 2018-09-26 | Stop reason: HOSPADM

## 2018-09-26 RX ORDER — LIDOCAINE HYDROCHLORIDE 10 MG/ML
0.3 INJECTION, SOLUTION EPIDURAL; INFILTRATION; INTRACAUDAL; PERINEURAL
Status: COMPLETED | OUTPATIENT
Start: 2018-09-26 | End: 2018-09-26

## 2018-09-26 RX ORDER — HYDROMORPHONE HCL 110MG/55ML
0.5 PATIENT CONTROLLED ANALGESIA SYRINGE INTRAVENOUS EVERY 5 MIN PRN
Status: DISCONTINUED | OUTPATIENT
Start: 2018-09-26 | End: 2018-09-26 | Stop reason: HOSPADM

## 2018-09-26 RX ORDER — PROPOFOL 10 MG/ML
INJECTION, EMULSION INTRAVENOUS PRN
Status: DISCONTINUED | OUTPATIENT
Start: 2018-09-26 | End: 2018-09-26 | Stop reason: SDUPTHER

## 2018-09-26 RX ADMIN — PROPOFOL 75 MCG/KG/MIN: 10 INJECTION, EMULSION INTRAVENOUS at 08:06

## 2018-09-26 RX ADMIN — FENTANYL CITRATE 25 MCG: 50 INJECTION INTRAMUSCULAR; INTRAVENOUS at 08:11

## 2018-09-26 RX ADMIN — SODIUM CHLORIDE, POTASSIUM CHLORIDE, SODIUM LACTATE AND CALCIUM CHLORIDE: 600; 310; 30; 20 INJECTION, SOLUTION INTRAVENOUS at 06:46

## 2018-09-26 RX ADMIN — PROPOFOL 60 MG: 10 INJECTION, EMULSION INTRAVENOUS at 08:05

## 2018-09-26 RX ADMIN — ONDANSETRON 4 MG: 2 INJECTION, SOLUTION INTRAMUSCULAR; INTRAVENOUS at 08:33

## 2018-09-26 RX ADMIN — PROPOFOL 20 MG: 10 INJECTION, EMULSION INTRAVENOUS at 08:18

## 2018-09-26 RX ADMIN — PROPOFOL 30 MG: 10 INJECTION, EMULSION INTRAVENOUS at 08:16

## 2018-09-26 RX ADMIN — MIDAZOLAM 2 MG: 1 INJECTION INTRAMUSCULAR; INTRAVENOUS at 08:01

## 2018-09-26 RX ADMIN — CEFAZOLIN SODIUM: 2 SOLUTION INTRAVENOUS at 07:59

## 2018-09-26 RX ADMIN — OXYCODONE AND ACETAMINOPHEN 2 TABLET: 5; 325 TABLET ORAL at 09:00

## 2018-09-26 RX ADMIN — LIDOCAINE HYDROCHLORIDE 0.1 ML: 10 INJECTION, SOLUTION EPIDURAL; INFILTRATION; INTRACAUDAL; PERINEURAL at 06:45

## 2018-09-26 RX ADMIN — PROPOFOL 20 MG: 10 INJECTION, EMULSION INTRAVENOUS at 08:08

## 2018-09-26 RX ADMIN — FENTANYL CITRATE 25 MCG: 50 INJECTION INTRAMUSCULAR; INTRAVENOUS at 08:02

## 2018-09-26 ASSESSMENT — PULMONARY FUNCTION TESTS
PIF_VALUE: 0
PIF_VALUE: 1
PIF_VALUE: 0
PIF_VALUE: 1
PIF_VALUE: 1
PIF_VALUE: 0
PIF_VALUE: 1
PIF_VALUE: 0
PIF_VALUE: 1
PIF_VALUE: 0
PIF_VALUE: 0
PIF_VALUE: 1
PIF_VALUE: 0
PIF_VALUE: 1
PIF_VALUE: 0
PIF_VALUE: 1
PIF_VALUE: 0

## 2018-09-26 ASSESSMENT — PAIN DESCRIPTION - LOCATION
LOCATION: FOOT
LOCATION: FOOT

## 2018-09-26 ASSESSMENT — PAIN SCALES - GENERAL
PAINLEVEL_OUTOF10: 5
PAINLEVEL_OUTOF10: 4
PAINLEVEL_OUTOF10: 0

## 2018-09-26 ASSESSMENT — PAIN DESCRIPTION - ORIENTATION
ORIENTATION: LEFT
ORIENTATION: LEFT

## 2018-09-26 ASSESSMENT — PAIN DESCRIPTION - DESCRIPTORS
DESCRIPTORS: ACHING;CONSTANT
DESCRIPTORS: ACHING

## 2018-09-26 ASSESSMENT — PAIN - FUNCTIONAL ASSESSMENT: PAIN_FUNCTIONAL_ASSESSMENT: 0-10

## 2018-09-26 ASSESSMENT — PAIN DESCRIPTION - PAIN TYPE
TYPE: SURGICAL PAIN
TYPE: SURGICAL PAIN

## 2018-09-26 ASSESSMENT — ACTIVITIES OF DAILY LIVING (ADL): EFFECT OF PAIN ON DAILY ACTIVITIES: WALKING, WEARING SHOES

## 2018-09-26 NOTE — PROGRESS NOTES
Discharge instructions given, pt and spouse verbalized understanding. Discussed follow-up appointments and medications. No questions or concerns at this time. Pt taken to personal vehicle via wheelchair.

## 2018-09-26 NOTE — BRIEF OP NOTE
Brief Postoperative Note  ____________________________________________________________    Patient: Jesus Overton  YOB: 1970  MRN: [id number]  Date of Procedure: 9/26/2018    Pre-Op Diagnosis: PAINFUL RETAINED HARDWARE LEFT FOOT    Post-Op Diagnosis: Same       Procedure(s):  REMOVAL OF PAINFUL RETAINED HARDWARE LEFT FOOT    Anesthesia: General    Surgeon(s):  Antoine Ulloa DPM     Assistant: Manisha Rios PGY-1    Staff:  Surgical Assistant: Kay Thomas     Estimated Blood Loss: < 50 cc    Complications: None    Specimens: None    Implants: None      Drains: None      Callie Montero DPM  Date: 9/26/2018  Time: 8:57 AM

## 2018-09-26 NOTE — OP NOTE
joint with a marking pen. Next, using a # 15 blade the incision was deepened down to and including the dermal layer. Next using sharp and blunt dissection, all vital neurovascular structures were identified and carefully retracted and the incision was deepened through the subcutaneous layer. All bleeders were ligated and electrocauterized. Using a #15 blade the incision was deepended through the scar tissue/perosteal layer to identify the prominent hardware. Using a rongeur all fibrous tissue overlying the screw head was removed. Once the screw head was identified, using the [facility] universal small screw extraction system the prominent and painful screw and washer were removed and passed from the operative field to the back table. At this time fluoroscopy was utilized to confirm that all the hardware had been removed. Next, copious amount of sterile normal saline was used to flush and irrigate the wound. Next, the periosteal layer/scar tissue was approximated using 3-0 vicryl in simple inturrupted fashion. Next, the skin incision was re-approximated utilizing 4-0 vicryl in a running subcuticular fashion. Upon completion of wound closure, a post-operative injection consisting of 10 cc of 0.5% marcaine was injected proximal to the incision site for post-operative comfort. The wound was then dressed with xeroform, 4x4 gauze, 4\" conform and 4\" Ace bandage and post-operative shoe. Tourniquet was then deflated at 20 minutes, following application of dressing and hyperemic response was noted to all digits of the left foot. The patient tolerated the procedure and anesthesia well. The patient was then transferred from the operative room to PACU with vital signs stable and vascular status intact to the left foot. Following a period of postoperative monitoring, the patient will be discharge to home with oral and written instructions.  Patient will be weightbearing as tolerated in post-operative shoe during the

## 2019-08-07 ENCOUNTER — OFFICE VISIT (OUTPATIENT)
Dept: ORTHOPEDIC SURGERY | Age: 49
End: 2019-08-07
Payer: COMMERCIAL

## 2019-08-07 VITALS — BODY MASS INDEX: 33.04 KG/M2 | WEIGHT: 218.03 LBS | HEIGHT: 68 IN

## 2019-08-07 DIAGNOSIS — M25.572 LEFT ANKLE PAIN, UNSPECIFIED CHRONICITY: Primary | ICD-10-CM

## 2019-08-07 PROCEDURE — 99244 OFF/OP CNSLTJ NEW/EST MOD 40: CPT | Performed by: ORTHOPAEDIC SURGERY

## 2019-08-12 ENCOUNTER — APPOINTMENT (OUTPATIENT)
Dept: GENERAL RADIOLOGY | Age: 49
End: 2019-08-12
Payer: COMMERCIAL

## 2019-08-12 ENCOUNTER — TELEPHONE (OUTPATIENT)
Dept: ORTHOPEDIC SURGERY | Age: 49
End: 2019-08-12

## 2019-08-12 ENCOUNTER — APPOINTMENT (OUTPATIENT)
Dept: CT IMAGING | Age: 49
End: 2019-08-12
Payer: COMMERCIAL

## 2019-08-12 ENCOUNTER — HOSPITAL ENCOUNTER (EMERGENCY)
Age: 49
Discharge: HOME | End: 2019-08-12
Attending: EMERGENCY MEDICINE
Payer: COMMERCIAL

## 2019-08-12 VITALS
HEART RATE: 67 BPM | BODY MASS INDEX: 34.1 KG/M2 | OXYGEN SATURATION: 97 % | DIASTOLIC BLOOD PRESSURE: 96 MMHG | SYSTOLIC BLOOD PRESSURE: 153 MMHG | HEIGHT: 68 IN | TEMPERATURE: 99.7 F | RESPIRATION RATE: 18 BRPM | WEIGHT: 225 LBS
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
## 2019-08-12 DIAGNOSIS — S16.1XXA STRAIN OF NECK MUSCLE, INITIAL ENCOUNTER: ICD-10-CM

## 2019-08-12 DIAGNOSIS — R07.9 CHEST PAIN, UNSPECIFIED TYPE: ICD-10-CM

## 2019-08-12 LAB
A/G RATIO: 1.4 (ref 1.1–2.2)
ALBUMIN SERPL-MCNC: 3.9 G/DL (ref 3.4–5)
ALP BLD-CCNC: 103 U/L (ref 40–129)
ALT SERPL-CCNC: 20 U/L (ref 10–40)
ANION GAP SERPL CALCULATED.3IONS-SCNC: 9 MMOL/L (ref 3–16)
AST SERPL-CCNC: 13 U/L (ref 15–37)
BASOPHILS ABSOLUTE: 0.1 K/UL (ref 0–0.2)
BASOPHILS RELATIVE PERCENT: 1.1 %
BILIRUB SERPL-MCNC: <0.2 MG/DL (ref 0–1)
BUN BLDV-MCNC: 16 MG/DL (ref 7–20)
CALCIUM SERPL-MCNC: 9 MG/DL (ref 8.3–10.6)
CHLORIDE BLD-SCNC: 108 MMOL/L (ref 99–110)
CO2: 27 MMOL/L (ref 21–32)
CREAT SERPL-MCNC: 0.8 MG/DL (ref 0.6–1.1)
EOSINOPHILS ABSOLUTE: 0.8 K/UL (ref 0–0.6)
EOSINOPHILS RELATIVE PERCENT: 11.5 %
GFR AFRICAN AMERICAN: >60
GFR NON-AFRICAN AMERICAN: >60
GLOBULIN: 2.8 G/DL
GLUCOSE BLD-MCNC: 119 MG/DL (ref 70–99)
HCT VFR BLD CALC: 37.7 % (ref 36–48)
HEMOGLOBIN: 13 G/DL (ref 12–16)
LYMPHOCYTES ABSOLUTE: 1.5 K/UL (ref 1–5.1)
LYMPHOCYTES RELATIVE PERCENT: 23 %
MCH RBC QN AUTO: 32.3 PG (ref 26–34)
MCHC RBC AUTO-ENTMCNC: 34.4 G/DL (ref 31–36)
MCV RBC AUTO: 93.9 FL (ref 80–100)
MONOCYTES ABSOLUTE: 0.3 K/UL (ref 0–1.3)
MONOCYTES RELATIVE PERCENT: 5.1 %
NEUTROPHILS ABSOLUTE: 4 K/UL (ref 1.7–7.7)
NEUTROPHILS RELATIVE PERCENT: 59.3 %
PDW BLD-RTO: 13.3 % (ref 12.4–15.4)
PLATELET # BLD: 212 K/UL (ref 135–450)
PMV BLD AUTO: 9.2 FL (ref 5–10.5)
POTASSIUM SERPL-SCNC: 3.9 MMOL/L (ref 3.5–5.1)
RBC # BLD: 4.02 M/UL (ref 4–5.2)
SODIUM BLD-SCNC: 144 MMOL/L (ref 136–145)
TOTAL PROTEIN: 6.7 G/DL (ref 6.4–8.2)
TROPONIN: <0.01 NG/ML
WBC # BLD: 6.7 K/UL (ref 4–11)

## 2019-08-12 PROCEDURE — 72128 CT CHEST SPINE W/O DYE: CPT

## 2019-08-12 PROCEDURE — 72125 CT NECK SPINE W/O DYE: CPT

## 2019-08-12 PROCEDURE — 6370000000 HC RX 637 (ALT 250 FOR IP): Performed by: NURSE PRACTITIONER

## 2019-08-12 PROCEDURE — 93005 ELECTROCARDIOGRAM TRACING: CPT | Performed by: NURSE PRACTITIONER

## 2019-08-12 PROCEDURE — 99284 EMERGENCY DEPT VISIT MOD MDM: CPT

## 2019-08-12 PROCEDURE — 80053 COMPREHEN METABOLIC PANEL: CPT

## 2019-08-12 PROCEDURE — 71046 X-RAY EXAM CHEST 2 VIEWS: CPT

## 2019-08-12 PROCEDURE — 85025 COMPLETE CBC W/AUTO DIFF WBC: CPT

## 2019-08-12 PROCEDURE — 84484 ASSAY OF TROPONIN QUANT: CPT

## 2019-08-12 RX ORDER — LISINOPRIL 20 MG/1
20 TABLET ORAL DAILY
COMMUNITY

## 2019-08-12 RX ORDER — OXYCODONE HYDROCHLORIDE AND ACETAMINOPHEN 5; 325 MG/1; MG/1
1 TABLET ORAL EVERY 6 HOURS PRN
Qty: 6 TABLET | Refills: 0 | Status: SHIPPED | OUTPATIENT
Start: 2019-08-12 | End: 2019-08-15

## 2019-08-12 RX ORDER — GABAPENTIN 800 MG/1
800 TABLET ORAL 3 TIMES DAILY
COMMUNITY

## 2019-08-12 RX ORDER — OXYCODONE HYDROCHLORIDE AND ACETAMINOPHEN 5; 325 MG/1; MG/1
1 TABLET ORAL ONCE
Status: COMPLETED | OUTPATIENT
Start: 2019-08-12 | End: 2019-08-12

## 2019-08-12 RX ORDER — NAPROXEN 250 MG/1
500 TABLET ORAL ONCE
Status: COMPLETED | OUTPATIENT
Start: 2019-08-12 | End: 2019-08-12

## 2019-08-12 RX ORDER — CYCLOBENZAPRINE HCL 10 MG
10 TABLET ORAL ONCE
Status: COMPLETED | OUTPATIENT
Start: 2019-08-12 | End: 2019-08-12

## 2019-08-12 RX ADMIN — CYCLOBENZAPRINE HYDROCHLORIDE 10 MG: 10 TABLET, FILM COATED ORAL at 16:00

## 2019-08-12 RX ADMIN — NAPROXEN 500 MG: 250 TABLET ORAL at 16:00

## 2019-08-12 RX ADMIN — OXYCODONE HYDROCHLORIDE AND ACETAMINOPHEN 1 TABLET: 5; 325 TABLET ORAL at 18:02

## 2019-08-12 ASSESSMENT — PAIN DESCRIPTION - PAIN TYPE
TYPE: ACUTE PAIN
TYPE: ACUTE PAIN

## 2019-08-12 ASSESSMENT — PAIN DESCRIPTION - LOCATION
LOCATION: NECK
LOCATION: SHOULDER;NECK;BACK

## 2019-08-12 ASSESSMENT — PAIN SCALES - GENERAL
PAINLEVEL_OUTOF10: 8
PAINLEVEL_OUTOF10: 6
PAINLEVEL_OUTOF10: 6
PAINLEVEL_OUTOF10: 8

## 2019-08-12 ASSESSMENT — HEART SCORE: ECG: 1

## 2019-08-12 ASSESSMENT — PAIN DESCRIPTION - FREQUENCY: FREQUENCY: CONTINUOUS

## 2019-08-12 ASSESSMENT — PAIN DESCRIPTION - ONSET: ONSET: SUDDEN

## 2019-08-12 ASSESSMENT — PAIN DESCRIPTION - DESCRIPTORS: DESCRIPTORS: SHOOTING
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
## 2019-08-13 LAB
EKG ATRIAL RATE: 69 BPM
EKG DIAGNOSIS: NORMAL
EKG P AXIS: 55 DEGREES
EKG P-R INTERVAL: 184 MS
EKG Q-T INTERVAL: 392 MS
EKG QRS DURATION: 90 MS
EKG QTC CALCULATION (BAZETT): 420 MS
EKG R AXIS: 26 DEGREES
EKG T AXIS: 87 DEGREES
EKG VENTRICULAR RATE: 69 BPM

## 2019-08-13 PROCEDURE — 93010 ELECTROCARDIOGRAM REPORT: CPT | Performed by: INTERNAL MEDICINE

## 2019-08-21 ENCOUNTER — OFFICE VISIT (OUTPATIENT)
Dept: ORTHOPEDIC SURGERY | Age: 49
End: 2019-08-21
Payer: COMMERCIAL

## 2019-08-21 ENCOUNTER — TELEPHONE (OUTPATIENT)
Dept: ORTHOPEDIC SURGERY | Age: 49
End: 2019-08-21

## 2019-08-21 VITALS — HEIGHT: 68 IN | WEIGHT: 225.09 LBS | BODY MASS INDEX: 34.11 KG/M2

## 2019-08-21 DIAGNOSIS — Z98.1 S/P CERVICAL SPINAL FUSION: Primary | ICD-10-CM

## 2019-08-21 PROCEDURE — 99213 OFFICE O/P EST LOW 20 MIN: CPT | Performed by: ORTHOPAEDIC SURGERY

## 2020-01-23 NOTE — ANESTHESIA POSTPROCEDURE EVALUATION
"Subjective:       Patient ID: Brittni Morin is a 25 y.o. female.    Vitals:  height is 5' 2" (1.575 m) and weight is 122.5 kg (270 lb). Her tympanic temperature is 99.3 °F (37.4 °C). Her blood pressure is 155/104 (abnormal) and her pulse is 106. Her respiration is 18 and oxygen saturation is 98%.     Chief Complaint: Cough (Productive (yellow) )    25-year-old female presents sinus congestion/, cough and sore throat.  States she was sick about 2 weeks ago with similar symptoms and resolved with OTC medication.  Started with symptoms again about 4 days ago and now worsening.  Discussed elevated blood pressure.  States she take blood pressure medication but did not take it today.     has a past medical history of Mau disease, left, Stanislaus's disease, Bone disease, CHF (congestive heart failure), and Hypertension.    Past Surgical History:  No date: LEG SURGERY      Comment:  Left tibia removed and right bone surgery to even height    Cough   This is a new problem. The current episode started in the past 7 days. The problem has been gradually worsening. The problem occurs constantly. The cough is productive of purulent sputum. Associated symptoms include nasal congestion and a sore throat. Pertinent negatives include no chest pain, chills, ear pain, eye redness, fever, hemoptysis, myalgias, rash, shortness of breath or wheezing. Nothing aggravates the symptoms. She has tried OTC cough suppressant for the symptoms. The treatment provided no relief. Her past medical history is significant for pneumonia.       Constitution: Negative for chills, sweating, fatigue and fever.   HENT: Positive for sinus pressure and sore throat. Negative for ear pain, ear discharge, congestion, sinus pain and voice change.    Neck: Negative for painful lymph nodes.   Cardiovascular: Negative for chest pain.   Eyes: Negative for eye redness.   Respiratory: Positive for cough and sputum production. Negative for chest tightness, " Department of Anesthesiology  Postprocedure Note    Patient: Dmitriy Jacinto  MRN: [id number]  YOB: 1970  Date of evaluation: 9/26/2018  Time:  1:43 PM     Procedure Summary     Date:  09/26/18 Room / Location:  [facility] OR 02 / [facility]    Anesthesia Start:  0800 Anesthesia Stop:  1211    Procedure:  REMOVAL OF PAINFUL RETAINED HARDWARE LEFT FOOT (Left Foot) Diagnosis:  (PAINFUL RETAINED HARDWARE LEFT FOOT)    Surgeon:  Shanique Lerma DPM Responsible Provider:  Maria Dolores Hagen MD    Anesthesia Type:  general ASA Status:  3          Anesthesia Type: general    Moy Phase I: Moy Score: 10    Moy Phase II: Moy Score: 10    Last vitals: Reviewed and per EMR flowsheets.        Anesthesia Post Evaluation    Comments: Postoperative Anesthesia Note    Name:    Dmitriy Jacinto  MRN:      [id number]    Patient Vitals in the past 12 hrs:  09/26/18 0930, BP:(!) 142/83, Pulse:71, Resp:14, SpO2:100 %  09/26/18 0915, BP:(!) 148/88, Pulse:63, Resp:15, SpO2:100 %  09/26/18 0900, BP:132/82, Temp:97.8 °F (36.6 °C), Temp src:Temporal, Pulse:70, Resp:17, SpO2:99 %  09/26/18 0855, BP:135/82, Pulse:68, Resp:14, SpO2:98 %  09/26/18 0850, BP:129/80, Pulse:60, Resp:16, SpO2:98 %  09/26/18 0845, BP:124/71, Temp:97.9 °F (36.6 °C), Temp src:Temporal, Pulse:67, Resp:19, SpO2:99 %  09/26/18 0626, BP:(!) 154/96, Temp:97.4 °F (36.3 °C), Temp src:Temporal, Pulse:62, Resp:16, SpO2:98 %     LABS:    CBC  Lab Results       Component                Value               Date/Time                  WBC                      8.0                 02/23/2011 09:40 AM        HGB                      13.7                02/23/2011 09:40 AM        HCT                      40.2                02/23/2011 09:40 AM        PLT                      227                 02/23/2011 09:40 AM   RENAL  Lab Results       Component                Value               Date/Time                  NA                       138 bloody sputum, COPD, shortness of breath, stridor, wheezing and asthma.    Gastrointestinal: Negative for nausea and vomiting.   Genitourinary: Negative for dysuria, frequency, urgency and urine decreased.   Musculoskeletal: Negative for muscle ache.   Skin: Negative for rash.   Allergic/Immunologic: Negative for seasonal allergies and asthma.   Neurological: Negative for dizziness, light-headedness and altered mental status.   Hematologic/Lymphatic: Negative for swollen lymph nodes.   Psychiatric/Behavioral: Negative for altered mental status and confusion.       Objective:      Physical Exam   Constitutional: She is oriented to person, place, and time. She appears well-developed and well-nourished. She is cooperative.  Non-toxic appearance. She appears ill. No distress.   HENT:   Head: Normocephalic and atraumatic.   Right Ear: Hearing, external ear and ear canal normal. A middle ear effusion is present.   Left Ear: Hearing, external ear and ear canal normal. A middle ear effusion is present.   Nose: Mucosal edema, rhinorrhea and purulent discharge present. No nasal deformity. No epistaxis. Right sinus exhibits maxillary sinus tenderness and frontal sinus tenderness. Left sinus exhibits maxillary sinus tenderness and frontal sinus tenderness.   Mouth/Throat: Uvula is midline and mucous membranes are normal. No trismus in the jaw. Normal dentition. No uvula swelling. Posterior oropharyngeal erythema present. No oropharyngeal exudate or posterior oropharyngeal edema.   Eyes: Conjunctivae and lids are normal. No scleral icterus.   Neck: Trachea normal, full passive range of motion without pain and phonation normal. Neck supple. No neck rigidity. No edema and no erythema present.   Cardiovascular: Normal rate, regular rhythm, normal heart sounds, intact distal pulses and normal pulses.   Pulmonary/Chest: Effort normal and breath sounds normal. No respiratory distress. She has no decreased breath sounds. She has no  rhonchi.   Abdominal: Normal appearance.   Musculoskeletal: Normal range of motion. She exhibits no edema or deformity.   Neurological: She is alert and oriented to person, place, and time. She exhibits normal muscle tone. Coordination normal.   Skin: Skin is warm, dry, intact, not diaphoretic and not pale.   Psychiatric: She has a normal mood and affect. Her speech is normal and behavior is normal. Judgment and thought content normal. Cognition and memory are normal.   Nursing note and vitals reviewed.        Assessment:       1. Bacterial sinusitis        Plan:         Bacterial sinusitis  -     amoxicillin-clavulanate 875-125mg (AUGMENTIN) 875-125 mg per tablet; Take 1 tablet by mouth 2 (two) times daily. for 10 days  Dispense: 20 tablet; Refill: 0  -     predniSONE (DELTASONE) 10 MG tablet; Take 1 tablet (10 mg total) by mouth once daily. for 5 days  Dispense: 5 tablet; Refill: 0      Patient Instructions     Sinusitis (Antibiotic Treatment)    The sinuses are air-filled spaces within the bones of the face. They connect to the inside of the nose. Sinusitis is an inflammation of the tissue lining the sinus cavity. Sinus inflammation can occur during a cold. It can also be due to allergies to pollens and other particles in the air. Sinusitis can cause symptoms of sinus congestion and fullness. A sinus infection causes fever, headache and facial pain. There is often green or yellow drainage from the nose or into the back of the throat (post-nasal drip). You have been given antibiotics to treat this condition.  Home care:  · Take the full course of antibiotics as instructed. Do not stop taking them, even if you feel better.  · Drink plenty of water, hot tea, and other liquids. This may help thin mucus. It also may promote sinus drainage.  · Heat may help soothe painful areas of the face. Use a towel soaked in hot water. Or,  the shower and direct the hot spray onto your face. Using a vaporizer along with a  menthol rub at night may also help.   · An expectorant containing guaifenesin may help thin the mucus and promote drainage from the sinuses.  · Over-the-counter decongestants may be used unless a similar medicine was prescribed. Nasal sprays work the fastest. Use one that contains phenylephrine or oxymetazoline. First blow the nose gently. Then use the spray. Do not use these medicines more often than directed on the label or symptoms may get worse. You may also use tablets containing pseudoephedrine. Avoid products that combine ingredients, because side effects may be increased. Read labels. You can also ask the pharmacist for help. (NOTE: Persons with high blood pressure should not use decongestants. They can raise blood pressure.)  · Over-the-counter antihistamines may help if allergies contributed to your sinusitis.    · Do not use nasal rinses or irrigation during an acute sinus infection, unless told to by your health care provider. Rinsing may spread the infection to other sinuses.  · Use acetaminophen or ibuprofen to control pain, unless another pain medicine was prescribed. (If you have chronic liver or kidney disease or ever had a stomach ulcer, talk with your doctor before using these medicines. Aspirin should never be used in anyone under 18 years of age who is ill with a fever. It may cause severe liver damage.)  · Don't smoke. This can worsen symptoms.  Follow-up care  Follow up with your healthcare provider or our staff if you are not improving within the next week.  When to seek medical advice  Call your healthcare provider if any of these occur:  · Facial pain or headache becoming more severe  · Stiff neck  · Unusual drowsiness or confusion  · Swelling of the forehead or eyelids  · Vision problems, including blurred or double vision  · Fever of 100.4ºF (38ºC) or higher, or as directed by your healthcare provider  · Seizure  · Breathing problems  · Symptoms not resolving within 10 days  Date Last  Reviewed: 4/13/2015  © 9221-6366 [employer]. [address] [place] [address], [place] [address]. All rights reserved. This information is not intended as a substitute for professional medical care. Always follow your healthcare professional's instructions.      Upper Respiratory Infections    The common cold/ viral upper respiratory infection is an acute, self-limiting infection of the upper respiratory tract characterized by variable degrees of sneezing, nasal congestion and discharge (rhinorrhea), sore throat, cough, low grade fever, headache, and malaise.    Symptoms usually peak on day 2 to 3 of illness and then gradually improve over 7 to 14 days.  A cough may linger for 3 to 4 weeks but should steadily improve over time.       The following information is provided to help you in treating upper respiratory infections.    Decongestant Nasal Sprays  Over-the-counter decongestant nasal spray such as Afrin, may be helpful as an initial step in treating upper respiratory infections. This spray can be used for up to approximately 3 days and is used no more than twice per day. Topical nasal decongestant spray for longer than 5 days will result in a physical addiction, in which the nasal lining will become significantly swollen and irritated until the spray is used again.     Nasal Saline  Nasal saline is available over the counter. There are several different commercial preparations such as Ocean spray and Ayr spray. There is no limit on the use of Nasal saline. Saline is used by snorting the mist up into the nose then later gently blowing the nose to get rid of any secretions that it has loosened.    Nasal Steroids  Nasal steroid medications such as Flonase are useful for upper respiratory infections, allergies, and sensitivities to airborne irritants. Unfortunately, they do not begin to work for 1-2 days, and they do not reach their maximum benefit for approximately 2-3 weeks. Initial therapy is typically 2  puffs per nostril twice per day. This should be used for only a few days, then the maintenance dosage is one or two puffs per nostril once per day. This can be done at any time of the day. The most effective way to use any nasal medication is to look down at your toes when spraying it in. Aim slightly away from the septum (dividing plate between the nostrils), and gently inhale. This ensures that the spray will go into the sinus cavities and not straight up into the nose. A good way to avoid spraying onto the septum is to use the right hand to spray into the left nostril, and vice versa for the right nostril. Occasionally, nasal steroids can increase the risk of nosebleeds, but in general they are very well tolerated and effective medications.    Antihistamines  Antihistamines are available both over-the-counter and as a prescription. There are also various decongestant and antihistamine combinations available such as Claritin, Allegra, and Zyrtec. It is best to take any antihistamine-decongestant combination in the morning to avoid insomnia. Zyrtec should probably be taken at night, in order to reduce the chance of sleepiness during the daytime. If there is a significant infection present and secretions are already thickened, it is recommended to discontinue antihistamines and use a mucous thinner/decongestant combination.    Mucous Thinners and Decongestants  Mucous thinners and decongestants are used to shrink down the tissues and promote sinus drainage. There are multiple prescription and over-the-counter varieties available. A mucous thinner will tend to be drying unless you are also drinking plenty of water when taking these. If you have high blood pressure, it is very important to monitor your pressure while on decongestants. The mucous thinner/decongestant combinations are typically given twice per day. However, some people will be unable to tolerate these at night and should only take them once per  day.    Oral Steroids  Oral steroids can be used with more sever infections. Often, they are the only medications that will reduce the symptoms of pressure and allow the nasal sinuses to drain. These are best taken on a full stomach and earlier in the day is better. They may give you some irritability, stomach upset, or hyperactivity. This can also interfere with sleep. A person who has high blood pressure or diabetes needs to be very careful to monitor their pressure or blood glucose while taking steroids. Steroids can have multiple side effects when taken long-term, but short-term doses are very well tolerated and extremely effective in controlling the symptoms associated with acute and chronic sinus infections, severe allergies, or nasal polyps. The only significant side effect of note with oral steroids is the extraordinarily uncommon occurrence of damage to the hip cartilage, which is very rare and is usually associated with long-term usage of steroids. The use of steroids for greater than approximately seven days requires a tapering down in order to discontinue them. You should not abruptly stop your steroid if you have been taking the same dose for greater than one week.    Antibiotic Treatment  Finally, when all of these other measures have failed, and a bacterial infection is present, an antibiotic will be prescribed. The most common symptoms of acute sinusitis of a bacterial nature are pain, pressure, and thick and colored nasal drainage. However, not all colored drainage means that there is a bacterial infection present. According to the Center for Disease Control, only 2% of colds will progress to result in bacterial sinusitis. Most upper respiratory infections should NOT be treated with antibiotics. Antibiotics should be reserved for upper respiratory infections which last longer than 10 days, or which worsen after 4 or 5 days of treatment. The use of antibiotics for nonbacterial upper respiratory  infections has resulted in a severe problem with the emergence of bacteria which are resistant to multiple forms of antibiotics, and some bacteria are currently only treatable with intravenous antibiotics.    Body Aches/Pains/Fever  For patients who are not allergic to and are not on anticoagulants, you can alternate Tylenol and Motrin every 4-6 hours for fever above 100.4F and/or pain.  For patients who are allergic or intolerant to NSAIDS, have gastritis, gastric ulcers, or history of GI bleeds, are pregnant, or are on anticoagulant therapy, you can take Tylenol every 4 hours as needed for fever above 100.4F and/or pain.     Maintain adequate hydration -  Rest and keep yourself/patient well hydrated. For adults, it is recommended to drink at least 8-10 glasses of water daily.  This may help thin secretions and soothe the respiratory mucosa.     Drink Hot Liquids (coffee, water, tea, hot chocolate or soup). Put liquid in a Mug and place in Microwave for 2 to 3 minutes. CHANGE THE CUP THAT WAS USED IN THE MICROWAVE SO AS NOT TO BURN YOUR MOUTH.  Sniff the steam from the cup and sip the heated liquid TEN TO TWELVE TIMES A DAY for 5 to 7 Days.    COUGH  A viral cough may linger for 3 to 4 weeks but should steadily improve over time.   Coughing is the body's natural way to clear mucus and help get rid of bacteria and viruses. Therefore, cough suppressants are usually not recomended.  Common cough suppressants include syrups with the ingredient dextromethorphan or DM, available over-the-counter, or prescription syrups containing codeine or hydrocone.  There is no proven benefit and have potential harms.       ?Honey may be beneficial, especially on nocturnal cough.   1 to 2 teaspoons can be taken straight or diluted in tea, juice or other liquid.    The antioxidants in honey are an important contributor to its decongestant properties. Generally speaking, darker honey contains more antioxidants. Buckwheat and avocado  honey are particularly good choices. If these honeys are not available in your area, choose the darkest honey you can find.        1.  Take all medications as directed. If you have been prescribed antibiotics, make sure to complete them.   2.  Rest and keep yourself/patient well hydrated. For adults, it is recommended to drink at least 8-10 glasses of water daily.   3.  For patients above 6 months of age who are not allergic to and are not on anticoagulants, you can alternate Tylenol and Motrin every 4-6 hours for fever above 100.4F and/or pain.  For patients less than 6 months of age, allergic to or intolerant to NSAIDS, have gastritis, gastric ulcers, or history of GI bleeds, are pregnant, or are on anticoagulant therapy, you can take Tylenol every 4 hours as needed for fever above 100.4F and/or pain.   4. You should schedule a follow-up appointment with your Primary Care Provider/Pediatrician for recheck in 2-3 days or as directed at this visit.   5.  If your condition fails to improve in a timely manner, you should receive another evaluation by your Primary Care Provider/Pediatrician to discuss your concerns or return to urgent care for a recheck.  If your condition worsens at any time, you should report immediately to your nearest Emergency Department for further evaluation. **You must understand that you have received Urgent Care treatment only and that you may be released before all of your medical problems are known or treated. You, the patient, are responsible to arrange for follow-up care as instructed.

## 2023-02-28 ENCOUNTER — OFFICE VISIT (OUTPATIENT)
Dept: FAMILY MEDICINE CLINIC | Age: 53
End: 2023-02-28

## 2023-02-28 VITALS
BODY MASS INDEX: 31.49 KG/M2 | HEIGHT: 68 IN | SYSTOLIC BLOOD PRESSURE: 150 MMHG | HEART RATE: 76 BPM | DIASTOLIC BLOOD PRESSURE: 92 MMHG | OXYGEN SATURATION: 98 % | WEIGHT: 207.8 LBS

## 2023-02-28 DIAGNOSIS — Z76.89 ENCOUNTER TO ESTABLISH CARE: ICD-10-CM

## 2023-02-28 DIAGNOSIS — R63.1 POLYDIPSIA: ICD-10-CM

## 2023-02-28 DIAGNOSIS — R63.5 WEIGHT GAIN: ICD-10-CM

## 2023-02-28 DIAGNOSIS — M54.2 NECK PAIN: Primary | ICD-10-CM

## 2023-02-28 DIAGNOSIS — Z80.3 FAMILY HISTORY OF BREAST CANCER: ICD-10-CM

## 2023-02-28 DIAGNOSIS — R35.89 POLYURIA: ICD-10-CM

## 2023-02-28 DIAGNOSIS — M96.1 FAILED NECK SYNDROME: ICD-10-CM

## 2023-02-28 DIAGNOSIS — I10 PRIMARY HYPERTENSION: ICD-10-CM

## 2023-02-28 LAB — HBA1C MFR BLD: 5.2 %

## 2023-02-28 RX ORDER — LOSARTAN POTASSIUM 50 MG/1
50 TABLET ORAL DAILY
Qty: 30 TABLET | Refills: 0 | Status: SHIPPED | OUTPATIENT
Start: 2023-02-28

## 2023-02-28 RX ORDER — FLUOXETINE HYDROCHLORIDE 40 MG/1
CAPSULE ORAL
COMMUNITY
Start: 2023-02-24

## 2023-02-28 ASSESSMENT — PATIENT HEALTH QUESTIONNAIRE - PHQ9
SUM OF ALL RESPONSES TO PHQ QUESTIONS 1-9: 0
SUM OF ALL RESPONSES TO PHQ QUESTIONS 1-9: 0
2. FEELING DOWN, DEPRESSED OR HOPELESS: 0
1. LITTLE INTEREST OR PLEASURE IN DOING THINGS: 0
SUM OF ALL RESPONSES TO PHQ QUESTIONS 1-9: 0
SUM OF ALL RESPONSES TO PHQ QUESTIONS 1-9: 0
SUM OF ALL RESPONSES TO PHQ9 QUESTIONS 1 & 2: 0

## 2023-02-28 ASSESSMENT — ENCOUNTER SYMPTOMS
RHINORRHEA: 0
SHORTNESS OF BREATH: 0
COUGH: 0

## 2023-02-28 NOTE — PROGRESS NOTES
[facility]  Establish care visit   2023    Jessica Cornelius (:  1970) is a 52 y.o. female, here to establish care.    Chief Complaint   Patient presents with    Establish Care     Pt is here to establish care.        ASSESSMENT/ PLAN  1. Neck pain  Referral to pain management.  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  - [facility] - Yandel Puga MD, Pain Management, [facility]    2.  Failed neck syndrome  Referral to pain management    3. Primary hypertension  Restart blood pressure medicine.  We will start losartan 50 mg p.o. daily and follow-up in 2 weeks.    4. Encounter to establish care  Referral to OB/GYN, has been 20 years since patient had a Pap smear  - [facility] - Elva Arroyo, , Gynecology, [facility]    5. Polyuria  Point-of-care and care A1c was 5.2    6. Polydipsia  A1c normal    7. Weight gain  A1c, if normal consider TSH      Return in about 2 weeks (around 3/14/2023) for blood pressure.    HPI  Patient is here to establish care.  She was canoeing and felt a pop in her neck was found to have a herniated disc with spinal cord compression.  She is status post cervical fusion and continues to have significant pain.  She works as a  at the high school and was called by her previous pain management physician for pill count but was teaching and unable to leave her class unattended and was dismissed from the practice.  She has noticed recent weight gain, polyuria, polydipsia.  She was treated for hypertension previously and noticed some neck tension at that time which she is feeling again.  No increase in headaches from baseline, no vision changes or chest pain or pressure.  She does have an order for mammogram but has not gotten it done yet.  Took a Cologuard test which was positive, colonoscopy was then negative.    ROS  Review of Systems  Constitutional:  Positive for unexpected weight change. Negative for chills and fever. HENT:  Negative for rhinorrhea. Respiratory:  Negative for cough and shortness of breath. Cardiovascular:  Negative for chest pain. Endocrine: Positive for polydipsia and polyuria. Musculoskeletal:  Positive for neck pain. HISTORIES  Current Outpatient Medications on File Prior to Visit   Medication Sig Dispense Refill    FLUoxetine (PROZAC) 40 MG capsule       gabapentin (NEURONTIN) 800 MG tablet Take 800 mg by mouth 3 times daily. No current facility-administered medications on file prior to visit.         Allergies   Allergen Reactions    Morphine And Related Hives and Rash     HIVES      Robaxin [Methocarbamol] Nausea And Vomiting     SEVERE    Macrobid [Nitrofurantoin Monohyd Macro] Nausea And Vomiting    Morphine      HIVES    Tramadol Hives    Levofloxacin      Tendon rupture    Shellfish-Derived Products Nausea And Vomiting     CAN USE IODINE       Past Medical History:   Diagnosis Date    Arthritis     Depression     Elbow injury     CASTED     Fractures     ELBOW AND WRIST    Headache     Hypertension     Wrist fracture     AS CHILD       Patient Active Problem List   Diagnosis    Elbow contusion    Elbow pain    Sprain and strain of unspecified site of shoulder and upper arm    Chronic pain syndrome    Failed neck syndrome    Primary insomnia    Fibromyalgia    Mood disorder (HCC)       Past Surgical History:   Procedure Laterality Date    BUNIONECTOMY      CARPAL TUNNEL RELEASE      bilateral    CERVICAL FUSION      X3  NECK PROCEDURE LAST    PROCEDURE  TORN JUGULAR    CHOLECYSTECTOMY      CYST REMOVAL      left wrist x 2    HYSTERECTOMY (CERVIX STATUS UNKNOWN)      OTHER SURGICAL HISTORY Left 05/03/2017    Gastroc recession left    OTHER SURGICAL HISTORY Left 11/21/2017    3RD METATARSAL OSTEOTOMY, NEUROMA EXCISION SECON   THIRD    IA REMOVAL IMPLANT DEEP Left 9/26/2018    REMOVAL OF PAINFUL RETAINED HARDWARE LEFT FOOT performed by Cem Jeffrey DPM at [facility] OR    SALIVARY GLAND SURGERY      removal of benign cyst       Social History     Socioeconomic History    Marital status:      Spouse name: Not on file    Number of children: Not on file    Years of education: Not on file    Highest education level: Not on file   Occupational History    Occupation: teacher   Tobacco Use    Smoking status: Never    Smokeless tobacco: Never    Tobacco comments:     Nicotine gum   Vaping Use    Vaping Use: Never used   Substance and Sexual Activity    Alcohol use: No     Alcohol/week: 0.0 standard drinks    Drug use: No    Sexual activity: Not on file   Other Topics Concern    Not on file   Social History Narrative    Not on file     Social Determinants of Health     Financial Resource Strain: Not on file   Food Insecurity: Not on file   Transportation Needs: Not on file   Physical Activity: Not on file   Stress: Not on file   Social Connections: Not on file   Intimate Partner Violence: Not on file   Housing Stability: Not on file        Family History   Problem Relation Age of Onset    High Blood Pressure Mother     No Known Problems Father     No Known Problems Brother     Breast Cancer Paternal Grandmother     Breast Cancer Paternal Aunt        PE  Vitals:    02/28/23 1408   BP: (!) 150/92   Site: Left Upper Arm   Position: Sitting   Pulse: 76   SpO2: 98%   Weight: 207 lb 12.8 oz (94.3 kg)   Height: 5' 7.99\" (1.727 m)     Estimated body mass index is 31.61 kg/m² as calculated from the following:    Height as of this encounter: 5' 7.99\" (1.727 m).    Weight as of this encounter: 207 lb 12.8 oz (94.3 kg).    Physical Exam  Constitutional:       General: She is not in acute distress.     Appearance: Normal appearance. She is normal weight.   HENT:      Head: Normocephalic and atraumatic.      Right Ear: External ear normal.      Left Ear: External ear normal.      Nose: No rhinorrhea.       Mouth/Throat:      Mouth: Mucous membranes are moist.      Pharynx: Oropharynx is clear. Eyes:      Extraocular Movements: Extraocular movements intact. Conjunctiva/sclera: Conjunctivae normal.      Pupils: Pupils are equal, round, and reactive to light. Cardiovascular:      Rate and Rhythm: Normal rate and regular rhythm. Heart sounds: Normal heart sounds. No murmur heard. No friction rub. No gallop. Pulmonary:      Effort: Pulmonary effort is normal.      Breath sounds: Normal breath sounds. Neurological:      General: No focal deficit present. Mental Status: She is alert. Psychiatric:         Mood and Affect: Mood normal.       Immunization History   Administered Date(s) Administered    COVID-19, PFIZER Bivalent BOOSTER, DO NOT Dilute, (age 12y+), IM, 30 mcg/0.3 mL 11/26/2022    COVID-19, PFIZER GRAY top, DO NOT Dilute, (age 15 y+), IM, 30 mcg/0.3 mL 05/13/2022    COVID-19, PFIZER PURPLE top, DILUTE for use, (age 15 y+), 30mcg/0.3mL 02/26/2021, 03/19/2021, 10/07/2021    Tdap (Boostrix, Adacel) 08/19/2015       Health Maintenance   Topic Date Due    Depression Screen  Never done    HIV screen  Never done    Hepatitis C screen  Never done    Lipids  Never done    Breast cancer screen  Never done    Colorectal Cancer Screen  07/19/2025    DTaP/Tdap/Td vaccine (2 - Td or Tdap) 08/19/2025    Flu vaccine  Completed    Shingles vaccine  Completed    COVID-19 Vaccine  Completed    Hepatitis A vaccine  Aged Out    Hib vaccine  Aged Out    Meningococcal (ACWY) vaccine  Aged Out    Pneumococcal 0-64 years Vaccine  Aged Out       PSH, PMH, SH and FH reviewed and noted. Recent and past labs, tests and consults also reviewed. Recent or new meds also reviewed. Keysha Bright DO    This dictation was generated by voice recognition computer software. Although all attempts are made to edit the dictation for accuracy, there may be errors in the transcription that are not intended.

## 2023-03-01 ENCOUNTER — NURSE ONLY (OUTPATIENT)
Dept: FAMILY MEDICINE CLINIC | Age: 53
End: 2023-03-01

## 2023-03-01 DIAGNOSIS — M54.2 NECK PAIN: Primary | ICD-10-CM

## 2023-03-01 DIAGNOSIS — M54.2 NECK PAIN: ICD-10-CM

## 2023-03-01 RX ORDER — HYDROCODONE BITARTRATE AND ACETAMINOPHEN 7.5; 325 MG/1; MG/1
1 TABLET ORAL EVERY 6 HOURS PRN
Qty: 28 TABLET | Refills: 0 | Status: SHIPPED | OUTPATIENT
Start: 2023-03-01 | End: 2023-03-08

## 2023-03-05 LAB
6-ACETYLMORPHINE: NOT DETECTED
7-AMINOCLONAZEPAM: NOT DETECTED
ALPHA-OH-ALPRAZOLAM: NOT DETECTED
ALPHA-OH-MIDAZOLAM, URINE: NOT DETECTED
ALPRAZOLAM: NOT DETECTED
AMPHETAMINE: NOT DETECTED
BARBITURATES: NOT DETECTED
BENZOYLECGONINE: NOT DETECTED
BUPRENORPHINE: NOT DETECTED
CARISOPRODOL: NOT DETECTED
CLONAZEPAM: NOT DETECTED
CODEINE: NOT DETECTED
CREATININE URINE: 34.4 MG/DL (ref 20–400)
DIAZEPAM: NOT DETECTED
DRUGS EXPECTED: NORMAL
EER PAIN MGT DRUG PANEL, HIGH RES/EMIT U: NORMAL
ETHYL GLUCURONIDE: NOT DETECTED
FENTANYL: NOT DETECTED
GABAPENTIN: PRESENT
HYDROCODONE: NOT DETECTED
HYDROMORPHONE: NOT DETECTED
LORAZEPAM: NOT DETECTED
MARIJUANA METABOLITE: NOT DETECTED
MDA: NOT DETECTED
MDEA: NOT DETECTED
MDMA URINE: NOT DETECTED
MEPERIDINE: NOT DETECTED
METHADONE: NOT DETECTED
METHAMPHETAMINE: NOT DETECTED
METHYLPHENIDATE: NOT DETECTED
MIDAZOLAM: NOT DETECTED
MORPHINE: NOT DETECTED
NALOXONE: NOT DETECTED
NORBUPRENORPHINE, FREE: NOT DETECTED
NORDIAZEPAM: NOT DETECTED
NORFENTANYL: NOT DETECTED
NORHYDROCODONE, URINE: NOT DETECTED
NOROXYCODONE: NOT DETECTED
NOROXYMORPHONE, URINE: NOT DETECTED
OXAZEPAM: NOT DETECTED
OXYCODONE: NOT DETECTED
OXYMORPHONE: NOT DETECTED
PAIN MANAGEMENT DRUG PANEL: NORMAL
PAIN MANAGEMENT DRUG PANEL: NORMAL
PCP: NOT DETECTED
PHENTERMINE: NOT DETECTED
PREGABALIN: NOT DETECTED
TAPENTADOL, URINE: NOT DETECTED
TAPENTADOL-O-SULFATE, URINE: NOT DETECTED
TEMAZEPAM: NOT DETECTED
TRAMADOL: NOT DETECTED
ZOLPIDEM: NOT DETECTED

## 2023-03-09 DIAGNOSIS — G89.4 CHRONIC PAIN SYNDROME: Primary | ICD-10-CM

## 2023-03-09 RX ORDER — HYDROCODONE BITARTRATE AND ACETAMINOPHEN 7.5; 325 MG/1; MG/1
1 TABLET ORAL EVERY 6 HOURS PRN
Qty: 20 TABLET | Refills: 0 | Status: SHIPPED | OUTPATIENT
Start: 2023-03-09 | End: 2023-03-14

## 2023-03-15 ENCOUNTER — OFFICE VISIT (OUTPATIENT)
Dept: FAMILY MEDICINE CLINIC | Age: 53
End: 2023-03-15
Payer: COMMERCIAL

## 2023-03-15 VITALS
HEIGHT: 68 IN | DIASTOLIC BLOOD PRESSURE: 88 MMHG | OXYGEN SATURATION: 100 % | WEIGHT: 210 LBS | BODY MASS INDEX: 31.83 KG/M2 | HEART RATE: 68 BPM | SYSTOLIC BLOOD PRESSURE: 144 MMHG

## 2023-03-15 DIAGNOSIS — I10 PRIMARY HYPERTENSION: Primary | ICD-10-CM

## 2023-03-15 DIAGNOSIS — M96.1 FAILED NECK SYNDROME: ICD-10-CM

## 2023-03-15 DIAGNOSIS — M54.2 NECK PAIN: Primary | ICD-10-CM

## 2023-03-15 PROCEDURE — 3077F SYST BP >= 140 MM HG: CPT | Performed by: STUDENT IN AN ORGANIZED HEALTH CARE EDUCATION/TRAINING PROGRAM

## 2023-03-15 PROCEDURE — 99214 OFFICE O/P EST MOD 30 MIN: CPT | Performed by: STUDENT IN AN ORGANIZED HEALTH CARE EDUCATION/TRAINING PROGRAM

## 2023-03-15 PROCEDURE — 3079F DIAST BP 80-89 MM HG: CPT | Performed by: STUDENT IN AN ORGANIZED HEALTH CARE EDUCATION/TRAINING PROGRAM

## 2023-03-15 SDOH — ECONOMIC STABILITY: HOUSING INSECURITY
IN THE LAST 12 MONTHS, WAS THERE A TIME WHEN YOU DID NOT HAVE A STEADY PLACE TO SLEEP OR SLEPT IN A SHELTER (INCLUDING NOW)?: NO

## 2023-03-15 SDOH — ECONOMIC STABILITY: TRANSPORTATION INSECURITY
IN THE PAST 12 MONTHS, HAS LACK OF TRANSPORTATION KEPT YOU FROM MEETINGS, WORK, OR FROM GETTING THINGS NEEDED FOR DAILY LIVING?: NO

## 2023-03-15 SDOH — ECONOMIC STABILITY: FOOD INSECURITY: WITHIN THE PAST 12 MONTHS, YOU WORRIED THAT YOUR FOOD WOULD RUN OUT BEFORE YOU GOT MONEY TO BUY MORE.: NEVER TRUE

## 2023-03-15 SDOH — ECONOMIC STABILITY: INCOME INSECURITY: HOW HARD IS IT FOR YOU TO PAY FOR THE VERY BASICS LIKE FOOD, HOUSING, MEDICAL CARE, AND HEATING?: NOT HARD AT ALL

## 2023-03-15 SDOH — ECONOMIC STABILITY: FOOD INSECURITY: WITHIN THE PAST 12 MONTHS, THE FOOD YOU BOUGHT JUST DIDN'T LAST AND YOU DIDN'T HAVE MONEY TO GET MORE.: NEVER TRUE

## 2023-03-15 ASSESSMENT — ENCOUNTER SYMPTOMS
COUGH: 0
RHINORRHEA: 0
SHORTNESS OF BREATH: 0

## 2023-03-15 NOTE — PROGRESS NOTES
[facility]  3/15/2023    Ambar Dunham (:  1970) is a 47 y.o. female, here for evaluation of the following medical concerns:    Chief Complaint   Patient presents with    Hypertension     2 week f/u        ASSESSMENT/ PLAN  1. Primary hypertension  Patient will get a blood pressure monitor and check her blood pressure 3 times weekly. She will call for blood pressures repeatedly above 433 systolic or 90 diastolic  - Basic Metabolic Panel; Future  - Basic Metabolic Panel    2. Failed neck syndrome  Patient will call her insurance and see which pain management physicians are in her network and let us know the names. We will give referral once obtained. No follow-ups on file. HPI  Patient is in a lot of pain today. She is out of her pain medication and was not able to establish with another pain management physician. 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 She states Dr. Tho Ballesteros passed on taking her as a patient because she was dismissed from a previous practice. She continues to have an extraordinary amount of pain. She has a medical marijuana card but it makes her too drowsy and she is not able to teach when she takes it. It makes her sleepy for about 2 days every time she takes the medication. She has been taking her losartan without side effects. The throbbing in her head and neck has improved. ROS  Review of Systems   Constitutional:  Negative for chills and fever. HENT:  Negative for rhinorrhea. Respiratory:  Negative for cough and shortness of breath. Cardiovascular:  Negative for chest pain. Musculoskeletal:  Positive for neck pain.      HISTORIES  Current Outpatient Medications on File Prior to Visit   Medication Sig Dispense Refill    FLUoxetine (PROZAC) 40 MG capsule       losartan (COZAAR) 50 MG tablet Take 1 tablet by mouth daily 30 tablet 0    gabapentin (NEURONTIN) 800 MG tablet Take 800 mg by mouth 3 times daily. No current facility-administered medications on file prior to visit. Past Medical History:   Diagnosis Date    Arthritis     Depression     Elbow injury     CASTED     Fractures     ELBOW AND WRIST    Headache     Hypertension     Wrist fracture     AS CHILD     Patient Active Problem List   Diagnosis    Elbow contusion    Elbow pain    Sprain and strain of unspecified site of shoulder and upper arm    Chronic pain syndrome    Failed neck syndrome    Primary insomnia    Fibromyalgia    Mood disorder (HCC)       PE  Vitals:    03/15/23 1511 03/15/23 1515   BP: (!) 142/88 (!) 144/88   Site: Left Upper Arm Left Upper Arm   Position: Sitting Sitting   Pulse: 68    SpO2: 100%    Weight: 210 lb (95.3 kg)    Height: 5' 7.9\" (1.725 m)      Estimated body mass index is 32.02 kg/m² as calculated from the following:    Height as of this encounter: 5' 7.9\" (1.725 m). Weight as of this encounter: 210 lb (95.3 kg). Physical Exam  Constitutional:       Appearance: Normal appearance. She is normal weight. HENT:      Head: Normocephalic and atraumatic. Mouth/Throat:      Mouth: Mucous membranes are moist.      Pharynx: Oropharynx is clear. Eyes:      Extraocular Movements: Extraocular movements intact. Conjunctiva/sclera: Conjunctivae normal.      Pupils: Pupils are equal, round, and reactive to light. Cardiovascular:      Rate and Rhythm: Normal rate and regular rhythm. Heart sounds: Normal heart sounds. No murmur heard. No friction rub. No gallop. Pulmonary:      Effort: Pulmonary effort is normal.      Breath sounds: Normal breath sounds. Abdominal:      General: Abdomen is flat. There is no distension. Palpations: Abdomen is soft. Tenderness: There is no abdominal tenderness. There is no guarding or rebound. Musculoskeletal:      Cervical back: Normal range of motion. Neurological:      General: No focal deficit present. Mental Status: She is alert. Psychiatric:         Mood and Affect: Mood normal.         Behavior: Behavior normal.       Alisia Gonzalez DO    This dictation was generated by voice recognition computer software. Although all attempts are made to edit the dictation for accuracy, there may be errors in the transcription that are not intended.

## 2023-03-16 LAB
ANION GAP SERPL CALCULATED.3IONS-SCNC: 9 MMOL/L (ref 3–16)
BUN SERPL-MCNC: 18 MG/DL (ref 7–20)
CALCIUM SERPL-MCNC: 9.2 MG/DL (ref 8.3–10.6)
CHLORIDE SERPL-SCNC: 107 MMOL/L (ref 99–110)
CO2 SERPL-SCNC: 25 MMOL/L (ref 21–32)
CREAT SERPL-MCNC: 0.8 MG/DL (ref 0.6–1.1)
GFR SERPLBLD CREATININE-BSD FMLA CKD-EPI: >60 ML/MIN/{1.73_M2}
GLUCOSE SERPL-MCNC: 101 MG/DL (ref 70–99)
POTASSIUM SERPL-SCNC: 4.5 MMOL/L (ref 3.5–5.1)
SODIUM SERPL-SCNC: 141 MMOL/L (ref 136–145)

## 2023-03-17 DIAGNOSIS — M54.2 NECK PAIN: ICD-10-CM

## 2023-03-17 RX ORDER — HYDROCODONE BITARTRATE AND ACETAMINOPHEN 7.5; 325 MG/1; MG/1
1 TABLET ORAL EVERY 6 HOURS PRN
Qty: 28 TABLET | Refills: 0 | Status: SHIPPED | OUTPATIENT
Start: 2023-03-17 | End: 2023-03-24

## 2023-03-23 RX ORDER — FLUOXETINE HYDROCHLORIDE 40 MG/1
40 CAPSULE ORAL DAILY
Qty: 30 CAPSULE | Refills: 2 | Status: SHIPPED | OUTPATIENT
Start: 2023-03-23

## 2023-03-23 RX ORDER — SEMAGLUTIDE 0.25 MG/.5ML
0.25 INJECTION, SOLUTION SUBCUTANEOUS
Qty: 2 ML | Refills: 0 | Status: SHIPPED | OUTPATIENT
Start: 2023-03-23 | End: 2023-04-19

## 2023-03-23 RX ORDER — GABAPENTIN 800 MG/1
800 TABLET ORAL 3 TIMES DAILY
Qty: 90 TABLET | Refills: 2 | Status: SHIPPED | OUTPATIENT
Start: 2023-03-23 | End: 2023-05-17

## 2023-03-27 RX ORDER — LOSARTAN POTASSIUM 50 MG/1
TABLET ORAL
Qty: 30 TABLET | Refills: 0 | Status: SHIPPED | OUTPATIENT
Start: 2023-03-27

## 2023-03-27 NOTE — TELEPHONE ENCOUNTER
Refill Request       Last Seen: Last Seen Department: 3/15/2023  Last Seen by PCP: 3/15/2023    Last Written: 02/28/2023      Next Appointment:   No future appointments.         Requested Prescriptions     Pending Prescriptions Disp Refills    losartan (COZAAR) 50 MG tablet [Pharmacy Med Name: LOSARTAN POTASSIUM 50 MG TAB] 30 tablet 0     Sig: TAKE ONE TABLET BY MOUTH DAILY

## 2023-04-19 RX ORDER — SEMAGLUTIDE 0.5 MG/.5ML
0.5 INJECTION, SOLUTION SUBCUTANEOUS
Qty: 2 ML | Refills: 0 | Status: SHIPPED | OUTPATIENT
Start: 2023-04-19 | End: 2023-05-12

## 2023-04-25 ENCOUNTER — TELEPHONE (OUTPATIENT)
Dept: FAMILY MEDICINE CLINIC | Age: 53
End: 2023-04-25

## 2023-04-25 RX ORDER — ONDANSETRON 4 MG/1
4 TABLET, FILM COATED ORAL EVERY 8 HOURS PRN
Qty: 20 TABLET | Refills: 0 | Status: SHIPPED | OUTPATIENT
Start: 2023-04-25 | End: 2023-05-23 | Stop reason: SDUPTHER

## 2023-04-26 NOTE — TELEPHONE ENCOUNTER
Submitted PA for Ondansetron HCl 4MG tablets, Key: [id number]. Medication has been DENIED. Will scan denial letter once received. Please notify patient. Thank you.

## 2023-05-01 RX ORDER — LOSARTAN POTASSIUM 50 MG/1
50 TABLET ORAL DAILY
Qty: 90 TABLET | Refills: 0 | Status: SHIPPED | OUTPATIENT
Start: 2023-05-01

## 2023-05-01 NOTE — TELEPHONE ENCOUNTER
Refill Request         Last Seen: Last Seen Department: 3/15/2023  Last Seen by PCP: 3/15/2023    Last Written: 03/27/2023      Next Appointment:   No future appointments.         Requested Prescriptions     Pending Prescriptions Disp Refills    losartan (COZAAR) 50 MG tablet 90 tablet 0     Sig: Take 1 tablet by mouth daily

## 2023-05-12 RX ORDER — SEMAGLUTIDE 0.25 MG/.5ML
0.25 INJECTION, SOLUTION SUBCUTANEOUS
Qty: 3 ML | Refills: 0 | Status: SHIPPED | OUTPATIENT
Start: 2023-05-12 | End: 2023-05-17

## 2023-05-15 ENCOUNTER — TELEPHONE (OUTPATIENT)
Dept: FAMILY MEDICINE CLINIC | Age: 53
End: 2023-05-15

## 2023-05-17 ENCOUNTER — OFFICE VISIT (OUTPATIENT)
Dept: FAMILY MEDICINE CLINIC | Age: 53
End: 2023-05-17
Payer: COMMERCIAL

## 2023-05-17 VITALS
OXYGEN SATURATION: 97 % | BODY MASS INDEX: 29.04 KG/M2 | DIASTOLIC BLOOD PRESSURE: 86 MMHG | WEIGHT: 191.6 LBS | HEIGHT: 68 IN | SYSTOLIC BLOOD PRESSURE: 132 MMHG | HEART RATE: 86 BPM

## 2023-05-17 DIAGNOSIS — M96.1 FAILED NECK SYNDROME: Primary | ICD-10-CM

## 2023-05-17 DIAGNOSIS — R20.2 PARESTHESIA AND PAIN OF RIGHT EXTREMITY: ICD-10-CM

## 2023-05-17 DIAGNOSIS — M79.609 PARESTHESIA AND PAIN OF RIGHT EXTREMITY: ICD-10-CM

## 2023-05-17 DIAGNOSIS — M54.2 NECK PAIN: ICD-10-CM

## 2023-05-17 PROCEDURE — 99214 OFFICE O/P EST MOD 30 MIN: CPT | Performed by: STUDENT IN AN ORGANIZED HEALTH CARE EDUCATION/TRAINING PROGRAM

## 2023-05-17 RX ORDER — PREDNISONE 10 MG/1
TABLET ORAL
Qty: 30 TABLET | Refills: 0 | Status: SHIPPED | OUTPATIENT
Start: 2023-05-17

## 2023-05-17 NOTE — PROGRESS NOTES
[address] [facility]  2023    Saurav Cuevas (:  1970) is a 46 y.o. female, here for evaluation of the following medical concerns:    Chief Complaint   Patient presents with    Neck Pain     Larry Payne on steps last friday    Arm Pain        ASSESSMENT/ PLAN  1. Failed neck syndrome  -Patient will be starting new Butrans patch today. She is now followed by Dr. Angelyn Habermann with pain management. He also called in baclofen for the patient. I will add prednisone 12-day taper.  - Referral to orthopedic spine surgeon  - MRI of the neck due to positive Spurling test  - ER for sudden weakness of upper or lower extremities, saddle anesthesia, bowel or bladder incontinence or any other severe symptoms. 2. Neck pain  -See plan above       No follow-ups on file. HPI  Was carrying laundry up the stairs and tripped and basket hit stairs causing acute on chronic neck pain. Now having tingling in the right arm and was always isolated to left arm. Tingling is on the underside of her right arm. Does not go past the elbow. Pain is currently 6/10. Caught herself with right hand outstretched. Pain was immediately 7-8/10. Has tried advil tylenol and butrans patch. None of that has been alleviating as of now. No muscle weakness or dropping things. No more falls since then. Neck fusion was , 2nd fusion was , 3rd level fusion was in . She denies any upper extremity weakness, no lower extremity weakness. No bowel or bladder incontinence. No saddle anesthesia or numbness anywhere. She was able to get established with pain management and is now seeing Dr. Angelyn Habermann. ROS  Review of Systems   Constitutional:  Negative for chills and fever. Musculoskeletal:  Positive for neck pain. Neurological:  Negative for weakness and numbness.         Paresthesia of right upper extremity     HISTORIES  Current Outpatient Medications on File Prior to Visit   Medication Sig Dispense Refill    losartan (COZAAR)

## 2023-05-23 RX ORDER — ONDANSETRON 4 MG/1
4 TABLET, FILM COATED ORAL EVERY 8 HOURS PRN
Qty: 20 TABLET | Refills: 0 | Status: SHIPPED | OUTPATIENT
Start: 2023-05-23

## 2023-05-23 NOTE — TELEPHONE ENCOUNTER
Refill Request       Last Seen: Last Seen Department: 5/17/2023  Last Seen by PCP: 5/17/2023    Last Written: 4/25/23        Next Appointment:   No future appointments.           Requested Prescriptions     Pending Prescriptions Disp Refills    ondansetron (ZOFRAN) 4 MG tablet 20 tablet 0     Sig: Take 1 tablet by mouth every 8 hours as needed for Nausea or Vomiting

## 2023-07-11 SDOH — HEALTH STABILITY: PHYSICAL HEALTH: ON AVERAGE, HOW MANY DAYS PER WEEK DO YOU ENGAGE IN MODERATE TO STRENUOUS EXERCISE (LIKE A BRISK WALK)?: 3 DAYS

## 2023-07-11 SDOH — HEALTH STABILITY: PHYSICAL HEALTH: ON AVERAGE, HOW MANY MINUTES DO YOU ENGAGE IN EXERCISE AT THIS LEVEL?: 20 MIN

## 2023-07-12 ENCOUNTER — OFFICE VISIT (OUTPATIENT)
Dept: ORTHOPEDIC SURGERY | Age: 53
End: 2023-07-12

## 2023-07-12 VITALS — HEIGHT: 68 IN | WEIGHT: 191 LBS | BODY MASS INDEX: 28.95 KG/M2

## 2023-07-12 DIAGNOSIS — Z98.1 HISTORY OF FUSION OF CERVICAL SPINE: Primary | ICD-10-CM

## 2023-07-12 DIAGNOSIS — M47.812 CERVICAL SPONDYLOSIS: ICD-10-CM

## 2023-07-12 RX ORDER — BACLOFEN 10 MG/1
TABLET ORAL
COMMUNITY
Start: 2023-05-15

## 2023-07-12 RX ORDER — BUPRENORPHINE 10 UG/H
PATCH TRANSDERMAL
COMMUNITY
Start: 2023-04-13

## 2023-07-12 NOTE — PROGRESS NOTES
New Patient: CERVICAL SPINE    Referring Provider:  Jarvis Jones DO    CHIEF COMPLAINT:    Chief Complaint   Patient presents with    Neck Pain     8 weeks ago patient fell going up the steps and has since hand increased neck pain. HISTORY OF PRESENT ILLNESS:   Ms. Judge Hunt is a pleasant 46 y.o. old today for the evaluation of neck and right trap pain. She is status post ACDF C4-C7 in the distant past placated with a vascular injury. Symptoms increased after a fall about 8 weeks ago. Those have improved with a recent course of oral steroids. She denies new upper and lower extremity radicular symptoms, loss of fine motor control and gait abnormality.     Current/Past Treatment:   Physical Therapy: Past  Chiropractic: Past  Injection: Past including JOSE and RFN  Medications: Oral steroids    Past Medical History:   Past Medical History:   Diagnosis Date    Arthritis     Depression     Elbow injury     CASTED     Fractures     ELBOW AND WRIST    Headache     Hypertension     Wrist fracture     AS CHILD      Past Surgical History:     Past Surgical History:   Procedure Laterality Date    BUNIONECTOMY      CARPAL TUNNEL RELEASE      bilateral    CERVICAL FUSION      X3  NECK PROCEDURE LAST    PROCEDURE  TORN JUGULAR    CHOLECYSTECTOMY      CYST REMOVAL      left wrist x 2    HYSTERECTOMY (CERVIX STATUS UNKNOWN)      OTHER SURGICAL HISTORY Left 05/03/2017    Gastroc recession left    OTHER SURGICAL HISTORY Left 11/21/2017    3RD METATARSAL OSTEOTOMY, NEUROMA EXCISION SECON   THIRD    GA REMOVAL IMPLANT DEEP Left 9/26/2018    REMOVAL OF PAINFUL RETAINED HARDWARE LEFT FOOT performed by Roberta Moon DPM at [facility] [place], [place]      removal of benign cyst     Current Medications:     Current Outpatient Medications:     baclofen (LIORESAL) 10 MG tablet, , Disp: , Rfl:     buprenorphine (BUPRENEX) 10 MCG/HR PTWK, , Disp: , Rfl:     Semaglutide, 2 MG/DOSE, 8 MG/3ML SOPN, Inject 2

## 2023-07-17 ENCOUNTER — HOSPITAL ENCOUNTER (OUTPATIENT)
Dept: MAMMOGRAPHY | Age: 53
Discharge: HOME | End: 2023-07-22
Payer: COMMERCIAL

## 2023-07-17 VITALS — WEIGHT: 191 LBS | HEIGHT: 68 IN | BODY MASS INDEX: 28.95 KG/M2

## 2023-07-17 DIAGNOSIS — Z12.31 VISIT FOR SCREENING MAMMOGRAM: ICD-10-CM

## 2023-07-17 PROCEDURE — 77067 SCR MAMMO BI INCL CAD: CPT

## 2023-07-22 ENCOUNTER — OFFICE VISIT (OUTPATIENT)
Dept: URGENT CARE | Age: 53
End: 2023-07-22

## 2023-07-22 ENCOUNTER — TELEPHONE (OUTPATIENT)
Dept: FAMILY MEDICINE CLINIC | Age: 53
End: 2023-07-22

## 2023-07-22 VITALS
HEART RATE: 72 BPM | HEIGHT: 68 IN | OXYGEN SATURATION: 98 % | WEIGHT: 173 LBS | BODY MASS INDEX: 26.22 KG/M2 | SYSTOLIC BLOOD PRESSURE: 143 MMHG | TEMPERATURE: 98.3 F | DIASTOLIC BLOOD PRESSURE: 89 MMHG

## 2023-07-22 DIAGNOSIS — R35.0 URINARY FREQUENCY: ICD-10-CM

## 2023-07-22 DIAGNOSIS — R30.0 DYSURIA: Primary | ICD-10-CM

## 2023-07-22 DIAGNOSIS — R31.9 HEMATURIA, UNSPECIFIED TYPE: ICD-10-CM

## 2023-07-22 LAB
BILIRUBIN, URINE, POC: NEGATIVE
BLOOD URINE, POC: ABNORMAL
GLUCOSE URINE, POC: NEGATIVE
KETONES, URINE, POC: NEGATIVE
LEUKOCYTE ESTERASE, URINE, POC: NEGATIVE
NITRITE, URINE, POC: NEGATIVE
PH, URINE, POC: 7 (ref 4.6–8)
PROTEIN,URINE, POC: 100
SPECIFIC GRAVITY, URINE, POC: 1.02 (ref 1–1.03)
URINALYSIS CLARITY, POC: CLEAR
URINALYSIS COLOR, POC: YELLOW
UROBILINOGEN, POC: ABNORMAL

## 2023-07-22 RX ORDER — CEFPODOXIME PROXETIL 200 MG/1
200 TABLET, FILM COATED ORAL 2 TIMES DAILY
Qty: 20 TABLET | Refills: 0 | Status: SHIPPED | OUTPATIENT
Start: 2023-07-22 | End: 2023-08-01

## 2023-07-22 NOTE — PROGRESS NOTES
Valentina Cheek (:  1970) is a 46 y.o. female,New patient, here for evaluation of the following chief complaint(s):  Urinary Tract Infection (Burning while urinating, blood in urine the last couple days. Has pain in lower right quadrant of abdomen. Has kidney stone in left lower lobe that is \"inactive\" x 2 years.)        ASSESSMENT/PLAN:    ICD-10-CM    1. Dysuria  R30.0 cefpodoxime (VANTIN) 200 MG tablet     Culture, Urine      2. Hematuria, unspecified type  R31.9 AMB POC URINALYSIS DIP STICK AUTO W/O MICRO     Culture, Urine      3. Urinary frequency  R35.0           UA showing large blood, and with symptoms and exam findings, concern for UTI. Despite noted mild RLQ pain on exam, low concern for appendicitis given no tenderness noted at McBurney's point, and no evidence of rebound tenderness or Rovsing's sign. Appendicitis also would not explain the noted urinary symptoms. Urine culture sent to confirm, to identify pathogen, and to clarify sensitivities. Also of concern is possible passing of a kidney stone, however no noted stones of the right side on last KUB, per patient. Will augment treatment plan as necessary pending culture results. Cefpodoxime sent for antibiotic treatment - take as directed until completed. Increase water intake during treatment. Can take ibuprofen (Motrin or Advil) or Acetaminophen (Tylenol) for pain symptoms. If fevers, shivering, nausea, vomiting, or severe abdominal or back pain develops, or if symptoms continue to worsen despite treatment plan, follow up with the ER for further evaluation. Follow up in 7 days if symptoms persist or if symptoms worsen. Discussed pt's elevated BP noted during vital signs - pt notes did not take her BP medication this morning - discussed taking her medication, as well as continued at-home monitoring of BP and follow up with PCP should elevated BP readings persist at home.         SUBJECTIVE/OBJECTIVE:  HPI:   46 y.o. female

## 2023-07-22 NOTE — PATIENT INSTRUCTIONS
UA showing large blood, and with symptoms and exam findings, concern for UTI. Urine culture sent to confirm, to identify pathogen, and to clarify sensitivities. Also of concern is possible passing of a kidney stone, however no noted stones of the right side on last KUB, per patient. Will augment treatment plan as necessary pending culture results. Cefpodoxime sent for antibiotic treatment - take as directed until completed. Increase water intake during treatment. Can take ibuprofen (Motrin or Advil) or Acetaminophen (Tylenol) for pain symptoms. If fevers, shivering, nausea, vomiting, or severe abdominal or back pain develops, or if symptoms continue to worsen despite treatment plan, follow up with the ER for further evaluation. Follow up in 7 days if symptoms persist or if symptoms worsen.

## 2023-07-22 NOTE — TELEPHONE ENCOUNTER
Received after hours page from patient with complaints of hematuria and dysuria. Patient was offered appointment with the virtualist, explained to patient how this works and what it would entail, explained that if appropriate virtualist could prescribe medications such as antibiotics. Advised that the other option would be to go to urgent care for evaluation. Patient verbalized understanding and stated she would go to urgent care. Patient denied fever.

## 2023-07-23 ASSESSMENT — ENCOUNTER SYMPTOMS
NAUSEA: 0
COUGH: 0
RHINORRHEA: 0
ABDOMINAL PAIN: 0
VOMITING: 0
DIARRHEA: 0
SORE THROAT: 0
SHORTNESS OF BREATH: 0

## 2023-07-24 ENCOUNTER — TELEPHONE (OUTPATIENT)
Dept: URGENT CARE | Age: 53
End: 2023-07-24

## 2023-07-24 LAB — BACTERIA UR CULT: NORMAL

## 2023-07-24 NOTE — TELEPHONE ENCOUNTER
Called patient with negative urine culture results. Verified 2 patient identifiers. Patient states that her bleeding has stopped but continues to have flank pain. She has h/o kidney stones. Recommend follow up with PCP for possible kidney stones.   She V/U.

## 2023-07-25 ENCOUNTER — HOSPITAL ENCOUNTER (OUTPATIENT)
Dept: CT IMAGING | Age: 53
Discharge: HOME | End: 2023-07-25
Attending: STUDENT IN AN ORGANIZED HEALTH CARE EDUCATION/TRAINING PROGRAM
Payer: COMMERCIAL

## 2023-07-25 DIAGNOSIS — M54.50 ACUTE LOW BACK PAIN, UNSPECIFIED BACK PAIN LATERALITY, UNSPECIFIED WHETHER SCIATICA PRESENT: ICD-10-CM

## 2023-07-25 DIAGNOSIS — R31.9 HEMATURIA, UNSPECIFIED TYPE: Primary | ICD-10-CM

## 2023-07-25 DIAGNOSIS — R31.9 HEMATURIA, UNSPECIFIED TYPE: ICD-10-CM

## 2023-07-25 DIAGNOSIS — Z87.442 HISTORY OF KIDNEY STONES: ICD-10-CM

## 2023-07-25 DIAGNOSIS — M54.50 ACUTE LOW BACK PAIN, UNSPECIFIED BACK PAIN LATERALITY, UNSPECIFIED WHETHER SCIATICA PRESENT: Primary | ICD-10-CM

## 2023-07-25 PROCEDURE — 74176 CT ABD & PELVIS W/O CONTRAST: CPT

## 2023-07-26 ENCOUNTER — HOSPITAL ENCOUNTER (OUTPATIENT)
Dept: GENERAL RADIOLOGY | Age: 53
Discharge: HOME | End: 2023-07-26
Payer: COMMERCIAL

## 2023-07-26 ENCOUNTER — HOSPITAL ENCOUNTER (OUTPATIENT)
Age: 53
Discharge: HOME | End: 2023-07-26
Payer: COMMERCIAL

## 2023-07-26 DIAGNOSIS — R10.9 ABDOMINAL PAIN, UNSPECIFIED ABDOMINAL LOCATION: ICD-10-CM

## 2023-07-26 PROCEDURE — 74018 RADEX ABDOMEN 1 VIEW: CPT

## 2023-08-02 RX ORDER — FLUOXETINE HYDROCHLORIDE 40 MG/1
40 CAPSULE ORAL DAILY
Qty: 30 CAPSULE | Refills: 2 | Status: SHIPPED | OUTPATIENT
Start: 2023-08-02

## 2023-08-02 NOTE — TELEPHONE ENCOUNTER
Refill Request       Last Seen: Last Seen Department: 5/17/2023  Last Seen by PCP: 5/17/2023    Last Written: 03/23/2023         Next Appointment:   No future appointments.           Requested Prescriptions     Pending Prescriptions Disp Refills    FLUoxetine (PROZAC) 40 MG capsule 30 capsule 2     Sig: Take 1 capsule by mouth daily

## 2023-08-23 ENCOUNTER — PATIENT MESSAGE (OUTPATIENT)
Dept: ORTHOPEDIC SURGERY | Age: 53
End: 2023-08-23

## 2023-08-24 NOTE — TELEPHONE ENCOUNTER
----- Message from Mervin Trujillo MD sent at 8/24/2023 10:18 AM EDT -----  Reviewed MRI, okay to proceed with intrathecal pump test  ----- Message -----  From: Renay Atkins MA  Sent: 8/24/2023   9:40 AM EDT  To: Mervin Trujillo MD    I recently had a cervical MRI without contrast done through [facility]. The results weren't great. I'm scheduled for an intrathecal pump test soon but it may need to be postponed. According to the pump doctor, we cannot proceed until the instability at C4 is addressed (if needed). Could you please look at the images and report? The concern is that the pump might mask a deteriorating situation. If it doesn't require intervention, I'd like to be cleared to proceed with the pump. Please let me know.

## 2023-10-03 NOTE — PROGRESS NOTES
COLONOSCOPY (11/08/2022)  Miscellaneous Results -  COLONOSCOPY (11/08/2022)  Component Value Ref Range Test Method Analysis Time Performed At Pathologist Signature    Colonoscopy Diverticulosis in sigmoid colon.  Otherwise normal exam. Repeat colo in 10 years (2032)               Miscellaneous Results -  COLONOSCOPY (11/08/2022)  Authorizing Provider Result Type   Historical Provider MD HEALTH MAINTENANCE     Visit Diagnoses  - documented in this encounter  Not on file  Additional Health Concerns  - documented as of this encounter  Additional Health Concerns  Assessment Noted Time   PHQ-9 Depression Total Score: 0 06/06/2018 2:00 PM EDT     Care Teams  - documented as of this encounter  Care Teams  Team Member Relationship Specialty Start Date End Date   Yadira Ross MD    [address], [facility]    [phone] (Work) [phone] (Fax)   PCP - General Internal Medicine 5/4/16 5/9/23

## 2023-10-27 RX ORDER — FLUOXETINE HYDROCHLORIDE 40 MG/1
40 CAPSULE ORAL DAILY
Qty: 30 CAPSULE | Refills: 2 | Status: SHIPPED | OUTPATIENT
Start: 2023-10-27

## 2023-10-27 NOTE — TELEPHONE ENCOUNTER
Refill Request       Last Seen: Last Seen Department: 5/17/2023  Last Seen by PCP: 5/17/2023    Last Written: 08/02/2023      Next Appointment:   No future appointments.         Requested Prescriptions     Pending Prescriptions Disp Refills    FLUoxetine (PROZAC) 40 MG capsule [Pharmacy Med Name: FLUoxetine HCL 40 MG CAPSULE] 30 capsule 2     Sig: TAKE 1 CAPSULE BY MOUTH DAILY

## 2023-11-07 RX ORDER — GABAPENTIN 800 MG/1
800 TABLET ORAL 3 TIMES DAILY
Qty: 90 TABLET | Refills: 2 | Status: SHIPPED | OUTPATIENT
Start: 2023-11-07 | End: 2024-02-05

## 2023-11-30 ENCOUNTER — TELEPHONE (OUTPATIENT)
Dept: FAMILY MEDICINE CLINIC | Age: 53
End: 2023-11-30

## 2023-11-30 NOTE — TELEPHONE ENCOUNTER
----- Message from Cody Bryson sent at 11/30/2023  3:00 PM EST -----  Subject: Appointment Request    Reason for Call: Established Patient Appointment needed: Urgent (Patient   Request) Back or Neck Pain    QUESTIONS    Reason for appointment request? No appointments available during search     Additional Information for Provider? Pt called & stated she is having   severe neck pain, different than her chronic neck pain. This pain is going   down the back right side of the neck and is newer and worsening. 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 The pain is in the soft tissue area and not the spine   & bones. PCP has no avail for a couple weeks and pt is in severe pain and   concerned. Please call pt & advise/schedule?  SHERITA ok  ---------------------------------------------------------------------------  --------------  Terry ARAUJO  [phone]; OK to leave message on voicemail  ---------------------------------------------------------------------------  --------------  SCRIPT ANSWERS

## 2023-12-14 ENCOUNTER — OFFICE VISIT (OUTPATIENT)
Dept: FAMILY MEDICINE CLINIC | Age: 53
End: 2023-12-14

## 2023-12-14 VITALS
WEIGHT: 166.8 LBS | OXYGEN SATURATION: 100 % | RESPIRATION RATE: 16 BRPM | HEART RATE: 54 BPM | DIASTOLIC BLOOD PRESSURE: 82 MMHG | BODY MASS INDEX: 25.28 KG/M2 | HEIGHT: 68 IN | SYSTOLIC BLOOD PRESSURE: 124 MMHG

## 2023-12-14 DIAGNOSIS — D64.9 ANEMIA, UNSPECIFIED TYPE: ICD-10-CM

## 2023-12-14 DIAGNOSIS — R21 RASH: ICD-10-CM

## 2023-12-14 DIAGNOSIS — R68.89 COLD INTOLERANCE: ICD-10-CM

## 2023-12-14 DIAGNOSIS — L85.3 DRY SKIN: ICD-10-CM

## 2023-12-14 DIAGNOSIS — L65.9 HAIR LOSS: ICD-10-CM

## 2023-12-14 DIAGNOSIS — R63.4 WEIGHT LOSS: Primary | ICD-10-CM

## 2023-12-14 LAB
ALBUMIN SERPL-MCNC: 4.3 G/DL (ref 3.4–5)
ALBUMIN/GLOB SERPL: 2.3 {RATIO} (ref 1.1–2.2)
ALP SERPL-CCNC: 73 U/L (ref 40–129)
ALT SERPL-CCNC: 15 U/L (ref 10–40)
ANION GAP SERPL CALCULATED.3IONS-SCNC: 8 MMOL/L (ref 3–16)
AST SERPL-CCNC: 15 U/L (ref 15–37)
BASOPHILS # BLD: 0 K/UL (ref 0–0.2)
BASOPHILS NFR BLD: 0.9 %
BILIRUB SERPL-MCNC: <0.2 MG/DL (ref 0–1)
BUN SERPL-MCNC: 16 MG/DL (ref 7–20)
CALCIUM SERPL-MCNC: 8.6 MG/DL (ref 8.3–10.6)
CHLORIDE SERPL-SCNC: 102 MMOL/L (ref 99–110)
CO2 SERPL-SCNC: 29 MMOL/L (ref 21–32)
CREAT SERPL-MCNC: 0.8 MG/DL (ref 0.6–1.1)
DEPRECATED RDW RBC AUTO: 13.5 % (ref 12.4–15.4)
EOSINOPHIL # BLD: 0.1 K/UL (ref 0–0.6)
EOSINOPHIL NFR BLD: 2.9 %
GFR SERPLBLD CREATININE-BSD FMLA CKD-EPI: >60 ML/MIN/{1.73_M2}
GLUCOSE SERPL-MCNC: 60 MG/DL (ref 70–99)
HCT VFR BLD AUTO: 34.6 % (ref 36–48)
HGB BLD-MCNC: 11.9 G/DL (ref 12–16)
LYMPHOCYTES # BLD: 2.1 K/UL (ref 1–5.1)
LYMPHOCYTES NFR BLD: 43.3 %
MCH RBC QN AUTO: 32.2 PG (ref 26–34)
MCHC RBC AUTO-ENTMCNC: 34.5 G/DL (ref 31–36)
MCV RBC AUTO: 93.3 FL (ref 80–100)
MONOCYTES # BLD: 0.3 K/UL (ref 0–1.3)
MONOCYTES NFR BLD: 5.5 %
NEUTROPHILS # BLD: 2.3 K/UL (ref 1.7–7.7)
NEUTROPHILS NFR BLD: 47.4 %
PLATELET # BLD AUTO: 202 K/UL (ref 135–450)
PMV BLD AUTO: 9.1 FL (ref 5–10.5)
POTASSIUM SERPL-SCNC: 3.7 MMOL/L (ref 3.5–5.1)
PROT SERPL-MCNC: 6.2 G/DL (ref 6.4–8.2)
RBC # BLD AUTO: 3.71 M/UL (ref 4–5.2)
SODIUM SERPL-SCNC: 139 MMOL/L (ref 136–145)
TSH SERPL DL<=0.005 MIU/L-ACNC: 2.21 UIU/ML (ref 0.27–4.2)
WBC # BLD AUTO: 4.8 K/UL (ref 4–11)

## 2023-12-14 RX ORDER — BUPRENORPHINE 20 UG/H
1 PATCH TRANSDERMAL WEEKLY
COMMUNITY

## 2023-12-15 LAB — ANA SER QL IA: NEGATIVE

## 2023-12-15 RX ORDER — GLUCOSAMINE HCL/CHONDROITIN SU 500-400 MG
CAPSULE ORAL
Qty: 100 STRIP | Refills: 0 | Status: SHIPPED | OUTPATIENT
Start: 2023-12-15

## 2023-12-15 RX ORDER — GLUCOSAM/CHON-MSM1/C/MANG/BOSW 500-416.6
1 TABLET ORAL DAILY
Qty: 100 EACH | Refills: 0 | Status: SHIPPED | OUTPATIENT
Start: 2023-12-15

## 2024-01-24 RX ORDER — GABAPENTIN 800 MG/1
800 TABLET ORAL 3 TIMES DAILY
Qty: 90 TABLET | Refills: 2 | Status: SHIPPED | OUTPATIENT
Start: 2024-01-24 | End: 2024-04-23

## 2024-01-24 RX ORDER — FLUOXETINE HYDROCHLORIDE 40 MG/1
40 CAPSULE ORAL DAILY
Qty: 30 CAPSULE | Refills: 2 | Status: SHIPPED | OUTPATIENT
Start: 2024-01-24

## 2024-01-24 NOTE — TELEPHONE ENCOUNTER
Refill Request     Last Seen: Last Seen Department: 12/14/2023  Last Seen by PCP: 12/14/2023    Last Written: 10/27/2023      Next Appointment:   No future appointments.        Requested Prescriptions     Pending Prescriptions Disp Refills    FLUoxetine (PROZAC) 40 MG capsule 30 capsule 2     Sig: Take 1 capsule by mouth daily    gabapentin (NEURONTIN) 800 MG tablet 90 tablet 2     Sig: Take 1 tablet by mouth 3 times daily for 90 days.

## 2024-02-19 NOTE — TELEPHONE ENCOUNTER
Refill Request     Last Seen: Last Seen Department: 12/14/2023  Last Seen by PCP: 12/14/2023    Last Written: 7/10/23      Next Appointment:   No future appointments.        Requested Prescriptions     Pending Prescriptions Disp Refills    semaglutide, 2 MG/DOSE, (OZEMPIC) 8 MG/3ML SOPN sc injection 3 mL 0     Sig: Inject 0.75 mLs into the skin once a week

## 2024-02-21 ENCOUNTER — TELEPHONE (OUTPATIENT)
Dept: ADMINISTRATIVE | Age: 54
End: 2024-02-21

## 2024-02-21 NOTE — TELEPHONE ENCOUNTER
Submitted PA for Ozempic (2 MG/DOSE) 8MG/3ML pen-injectors  Via CMM  (Key: [id number]) STATUS: PENDING.    Follow up done daily; if no decision with in three days we will refax.  If another three days goes by with no decision will call the insurance for status.

## 2024-04-29 RX ORDER — FLUOXETINE HYDROCHLORIDE 40 MG/1
40 CAPSULE ORAL DAILY
Qty: 30 CAPSULE | Refills: 2 | Status: SHIPPED | OUTPATIENT
Start: 2024-04-29

## 2024-04-29 NOTE — TELEPHONE ENCOUNTER
Refill Request       Last Seen: Last Seen Department: 12/14/2023  Last Seen by PCP: 12/14/2023    Last Written: 01/24/2024        Next Appointment:   No future appointments.        Requested Prescriptions     Pending Prescriptions Disp Refills    FLUoxetine (PROZAC) 40 MG capsule [Pharmacy Med Name: FLUoxetine HCL 40 MG CAPSULE] 30 capsule 2     Sig: TAKE 1 CAPSULE BY MOUTH DAILY

## 2024-07-11 RX ORDER — SEMAGLUTIDE 2.68 MG/ML
INJECTION, SOLUTION SUBCUTANEOUS
Qty: 3 ML | Refills: 0 | Status: SHIPPED | OUTPATIENT
Start: 2024-07-11

## 2024-07-11 RX ORDER — GABAPENTIN 800 MG/1
800 TABLET ORAL 3 TIMES DAILY
Qty: 90 TABLET | Refills: 2 | OUTPATIENT
Start: 2024-07-11 | End: 2024-10-09

## 2024-07-11 NOTE — TELEPHONE ENCOUNTER
Refill Request     Last Seen: Last Seen Department: 12/14/2023  Last Seen by PCP: 12/14/2023          Next Appointment:   No future appointments.        Requested Prescriptions     Pending Prescriptions Disp Refills    OZEMPIC, 2 MG/DOSE, 8 MG/3ML SOPN sc injection [Pharmacy Med Name: OZEMPIC 2 MG/DOSE (8 MG/3 ML)] 3 mL 0     Sig: DIAL AND INJECT UNDER THE SKIN 2 MG WEEKLY

## 2024-07-12 RX ORDER — GABAPENTIN 800 MG/1
800 TABLET ORAL 3 TIMES DAILY
Qty: 90 TABLET | Refills: 0 | Status: SHIPPED | OUTPATIENT
Start: 2024-07-12 | End: 2024-08-11

## 2024-07-12 NOTE — TELEPHONE ENCOUNTER
Refill Request     Last Seen: Last Seen Department: 12/14/2023  Last Seen by PCP: 12/14/2023          Next Appointment:   No future appointments.        Requested Prescriptions     Pending Prescriptions Disp Refills    gabapentin (NEURONTIN) 800 MG tablet 90 tablet 2     Sig: Take 1 tablet by mouth 3 times daily for 90 days.

## 2024-07-16 ENCOUNTER — TELEPHONE (OUTPATIENT)
Dept: FAMILY MEDICINE CLINIC | Age: 54
End: 2024-07-16

## 2024-07-17 NOTE — TELEPHONE ENCOUNTER
Submitted PA for Ozempic (2 MG/DOSE) 8MG/3ML pen-injectors  Via CMM Key: [id number] STATUS: PENDING.    Follow up done daily; if no decision with in three days we will refax.  If another three days goes by with no decision will call the insurance for status.

## 2024-07-23 ENCOUNTER — TELEPHONE (OUTPATIENT)
Dept: FAMILY MEDICINE CLINIC | Age: 54
End: 2024-07-23

## 2024-08-05 RX ORDER — FLUOXETINE HYDROCHLORIDE 40 MG/1
40 CAPSULE ORAL DAILY
Qty: 30 CAPSULE | Refills: 2 | Status: SHIPPED | OUTPATIENT
Start: 2024-08-05

## 2024-08-28 RX ORDER — GABAPENTIN 800 MG/1
800 TABLET ORAL 3 TIMES DAILY
Qty: 90 TABLET | Refills: 2 | Status: SHIPPED | OUTPATIENT
Start: 2024-08-28 | End: 2024-11-26

## 2024-09-09 RX ORDER — FLUOXETINE 40 MG/1
40 CAPSULE ORAL DAILY
Qty: 30 CAPSULE | Refills: 2 | Status: SHIPPED | OUTPATIENT
Start: 2024-09-09

## 2024-10-16 ENCOUNTER — APPOINTMENT (OUTPATIENT)
Dept: GENERAL RADIOLOGY | Age: 54
DRG: 871 | End: 2024-10-16
Payer: COMMERCIAL

## 2024-10-16 ENCOUNTER — HOSPITAL ENCOUNTER (INPATIENT)
Age: 54
LOS: 4 days | Discharge: HOME | DRG: 871 | End: 2024-10-20
Attending: EMERGENCY MEDICINE | Admitting: INTERNAL MEDICINE
Payer: COMMERCIAL

## 2024-10-16 ENCOUNTER — APPOINTMENT (OUTPATIENT)
Dept: CT IMAGING | Age: 54
DRG: 871 | End: 2024-10-16
Payer: COMMERCIAL

## 2024-10-16 DIAGNOSIS — R07.9 CHEST PAIN, UNSPECIFIED TYPE: ICD-10-CM

## 2024-10-16 DIAGNOSIS — I21.4 NSTEMI (NON-ST ELEVATED MYOCARDIAL INFARCTION) (HCC): Primary | ICD-10-CM

## 2024-10-16 DIAGNOSIS — I42.9 CARDIOMYOPATHY, UNSPECIFIED TYPE (HCC): ICD-10-CM

## 2024-10-16 DIAGNOSIS — M79.7 FIBROMYALGIA: ICD-10-CM

## 2024-10-16 DIAGNOSIS — R11.2 NAUSEA AND VOMITING, UNSPECIFIED VOMITING TYPE: ICD-10-CM

## 2024-10-16 DIAGNOSIS — G89.4 CHRONIC PAIN SYNDROME: ICD-10-CM

## 2024-10-16 DIAGNOSIS — R10.13 ABDOMINAL PAIN, EPIGASTRIC: ICD-10-CM

## 2024-10-16 DIAGNOSIS — R07.9 CHEST PAIN: ICD-10-CM

## 2024-10-16 PROBLEM — I51.81 TAKOTSUBO CARDIOMYOPATHY: Status: ACTIVE | Noted: 2024-10-16

## 2024-10-16 LAB
ALBUMIN SERPL-MCNC: 4.5 G/DL (ref 3.4–5)
ALBUMIN/GLOB SERPL: 1.9 {RATIO} (ref 1.1–2.2)
ALP SERPL-CCNC: 77 U/L (ref 40–129)
ALT SERPL-CCNC: 14 U/L (ref 10–40)
ANION GAP SERPL CALCULATED.3IONS-SCNC: 17 MMOL/L (ref 3–16)
APTT BLD: 26.8 SEC (ref 22.1–36.4)
AST SERPL-CCNC: 17 U/L (ref 15–37)
BASOPHILS # BLD: 0.2 K/UL (ref 0–0.2)
BASOPHILS NFR BLD: 1.2 %
BILIRUB SERPL-MCNC: 0.4 MG/DL (ref 0–1)
BILIRUB UR QL STRIP.AUTO: NEGATIVE
BUN SERPL-MCNC: 26 MG/DL (ref 7–20)
CALCIUM SERPL-MCNC: 9 MG/DL (ref 8.3–10.6)
CHLORIDE SERPL-SCNC: 98 MMOL/L (ref 99–110)
CLARITY UR: CLEAR
CO2 SERPL-SCNC: 22 MMOL/L (ref 21–32)
COLOR UR: YELLOW
CREAT SERPL-MCNC: 0.9 MG/DL (ref 0.6–1.1)
DEPRECATED RDW RBC AUTO: 13.4 % (ref 12.4–15.4)
ECHO BSA: 1.89 M2
EKG ATRIAL RATE: 72 BPM
EKG ATRIAL RATE: 74 BPM
EKG DIAGNOSIS: NORMAL
EKG DIAGNOSIS: NORMAL
EKG P AXIS: 57 DEGREES
EKG P AXIS: 63 DEGREES
EKG P-R INTERVAL: 146 MS
EKG P-R INTERVAL: 162 MS
EKG Q-T INTERVAL: 450 MS
EKG Q-T INTERVAL: 452 MS
EKG QRS DURATION: 86 MS
EKG QRS DURATION: 96 MS
EKG QTC CALCULATION (BAZETT): 492 MS
EKG QTC CALCULATION (BAZETT): 501 MS
EKG R AXIS: 45 DEGREES
EKG R AXIS: 52 DEGREES
EKG T AXIS: 130 DEGREES
EKG T AXIS: 72 DEGREES
EKG VENTRICULAR RATE: 72 BPM
EKG VENTRICULAR RATE: 74 BPM
EOSINOPHIL # BLD: 0.2 K/UL (ref 0–0.6)
EOSINOPHIL NFR BLD: 1.5 %
GFR SERPLBLD CREATININE-BSD FMLA CKD-EPI: 75 ML/MIN/{1.73_M2}
GLUCOSE BLD-MCNC: 159 MG/DL (ref 70–99)
GLUCOSE BLD-MCNC: 196 MG/DL (ref 70–99)
GLUCOSE SERPL-MCNC: 194 MG/DL (ref 70–99)
GLUCOSE UR STRIP.AUTO-MCNC: 100 MG/DL
HCT VFR BLD AUTO: 41 % (ref 36–48)
HGB BLD-MCNC: 13.7 G/DL (ref 12–16)
HGB UR QL STRIP.AUTO: ABNORMAL
KETONES UR STRIP.AUTO-MCNC: 15 MG/DL
LEUKOCYTE ESTERASE UR QL STRIP.AUTO: ABNORMAL
LIPASE SERPL-CCNC: 36 U/L (ref 13–60)
LYMPHOCYTES # BLD: 3.5 K/UL (ref 1–5.1)
LYMPHOCYTES NFR BLD: 24.7 %
MCH RBC QN AUTO: 32.1 PG (ref 26–34)
MCHC RBC AUTO-ENTMCNC: 33.4 G/DL (ref 31–36)
MCV RBC AUTO: 96.2 FL (ref 80–100)
MONOCYTES # BLD: 0.8 K/UL (ref 0–1.3)
MONOCYTES NFR BLD: 5.5 %
NEUTROPHILS # BLD: 9.4 K/UL (ref 1.7–7.7)
NEUTROPHILS NFR BLD: 67.1 %
NITRITE UR QL STRIP.AUTO: NEGATIVE
PERFORMED ON: ABNORMAL
PERFORMED ON: ABNORMAL
PH UR STRIP.AUTO: 6.5 [PH] (ref 5–8)
PLATELET # BLD AUTO: 335 K/UL (ref 135–450)
PMV BLD AUTO: 8.9 FL (ref 5–10.5)
POTASSIUM SERPL-SCNC: 3.1 MMOL/L (ref 3.5–5.1)
PROT SERPL-MCNC: 6.9 G/DL (ref 6.4–8.2)
PROT UR STRIP.AUTO-MCNC: ABNORMAL MG/DL
RBC # BLD AUTO: 4.26 M/UL (ref 4–5.2)
RBC #/AREA URNS HPF: NORMAL /HPF (ref 0–4)
SODIUM SERPL-SCNC: 137 MMOL/L (ref 136–145)
SP GR UR STRIP.AUTO: 1.02 (ref 1–1.03)
TROPONIN, HIGH SENSITIVITY: 265 NG/L (ref 0–14)
TROPONIN, HIGH SENSITIVITY: 49 NG/L (ref 0–14)
UA DIPSTICK W REFLEX MICRO PNL UR: YES
URN SPEC COLLECT METH UR: ABNORMAL
UROBILINOGEN UR STRIP-ACNC: 0.2 E.U./DL
WBC # BLD AUTO: 14 K/UL (ref 4–11)
WBC #/AREA URNS HPF: NORMAL /HPF (ref 0–5)

## 2024-10-16 PROCEDURE — 85670 THROMBIN TIME PLASMA: CPT

## 2024-10-16 PROCEDURE — 6370000000 HC RX 637 (ALT 250 FOR IP): Performed by: INTERNAL MEDICINE

## 2024-10-16 PROCEDURE — 74177 CT ABD & PELVIS W/CONTRAST: CPT

## 2024-10-16 PROCEDURE — C1894 INTRO/SHEATH, NON-LASER: HCPCS | Performed by: INTERNAL MEDICINE

## 2024-10-16 PROCEDURE — 96366 THER/PROPH/DIAG IV INF ADDON: CPT

## 2024-10-16 PROCEDURE — 6360000002 HC RX W HCPCS

## 2024-10-16 PROCEDURE — 71045 X-RAY EXAM CHEST 1 VIEW: CPT

## 2024-10-16 PROCEDURE — 1200000000 HC SEMI PRIVATE

## 2024-10-16 PROCEDURE — 85732 THROMBOPLASTIN TIME PARTIAL: CPT

## 2024-10-16 PROCEDURE — 2580000003 HC RX 258: Performed by: INTERNAL MEDICINE

## 2024-10-16 PROCEDURE — 6360000004 HC RX CONTRAST MEDICATION: Performed by: INTERNAL MEDICINE

## 2024-10-16 PROCEDURE — B2151ZZ FLUOROSCOPY OF LEFT HEART USING LOW OSMOLAR CONTRAST: ICD-10-PCS | Performed by: INTERNAL MEDICINE

## 2024-10-16 PROCEDURE — 96375 TX/PRO/DX INJ NEW DRUG ADDON: CPT

## 2024-10-16 PROCEDURE — 81001 URINALYSIS AUTO W/SCOPE: CPT

## 2024-10-16 PROCEDURE — 99152 MOD SED SAME PHYS/QHP 5/>YRS: CPT | Performed by: INTERNAL MEDICINE

## 2024-10-16 PROCEDURE — 93458 L HRT ARTERY/VENTRICLE ANGIO: CPT | Performed by: INTERNAL MEDICINE

## 2024-10-16 PROCEDURE — 2580000003 HC RX 258: Performed by: PHYSICIAN ASSISTANT

## 2024-10-16 PROCEDURE — 7100000011 HC PHASE II RECOVERY - ADDTL 15 MIN: Performed by: INTERNAL MEDICINE

## 2024-10-16 PROCEDURE — 85730 THROMBOPLASTIN TIME PARTIAL: CPT

## 2024-10-16 PROCEDURE — 4A023N7 MEASUREMENT OF CARDIAC SAMPLING AND PRESSURE, LEFT HEART, PERCUTANEOUS APPROACH: ICD-10-PCS | Performed by: INTERNAL MEDICINE

## 2024-10-16 PROCEDURE — 96376 TX/PRO/DX INJ SAME DRUG ADON: CPT

## 2024-10-16 PROCEDURE — 99285 EMERGENCY DEPT VISIT HI MDM: CPT

## 2024-10-16 PROCEDURE — 6360000002 HC RX W HCPCS: Performed by: INTERNAL MEDICINE

## 2024-10-16 PROCEDURE — 7100000010 HC PHASE II RECOVERY - FIRST 15 MIN: Performed by: INTERNAL MEDICINE

## 2024-10-16 PROCEDURE — 84484 ASSAY OF TROPONIN QUANT: CPT

## 2024-10-16 PROCEDURE — 85025 COMPLETE CBC W/AUTO DIFF WBC: CPT

## 2024-10-16 PROCEDURE — 6370000000 HC RX 637 (ALT 250 FOR IP): Performed by: PHYSICIAN ASSISTANT

## 2024-10-16 PROCEDURE — 96361 HYDRATE IV INFUSION ADD-ON: CPT

## 2024-10-16 PROCEDURE — B2111ZZ FLUOROSCOPY OF MULTIPLE CORONARY ARTERIES USING LOW OSMOLAR CONTRAST: ICD-10-PCS | Performed by: INTERNAL MEDICINE

## 2024-10-16 PROCEDURE — 96365 THER/PROPH/DIAG IV INF INIT: CPT

## 2024-10-16 PROCEDURE — 93005 ELECTROCARDIOGRAM TRACING: CPT | Performed by: EMERGENCY MEDICINE

## 2024-10-16 PROCEDURE — 6360000004 HC RX CONTRAST MEDICATION: Performed by: PHYSICIAN ASSISTANT

## 2024-10-16 PROCEDURE — 6360000002 HC RX W HCPCS: Performed by: PHYSICIAN ASSISTANT

## 2024-10-16 PROCEDURE — 83690 ASSAY OF LIPASE: CPT

## 2024-10-16 PROCEDURE — C1769 GUIDE WIRE: HCPCS | Performed by: INTERNAL MEDICINE

## 2024-10-16 PROCEDURE — 93010 ELECTROCARDIOGRAM REPORT: CPT | Performed by: INTERNAL MEDICINE

## 2024-10-16 PROCEDURE — 99223 1ST HOSP IP/OBS HIGH 75: CPT | Performed by: INTERNAL MEDICINE

## 2024-10-16 PROCEDURE — 80053 COMPREHEN METABOLIC PANEL: CPT

## 2024-10-16 PROCEDURE — 2709999900 HC NON-CHARGEABLE SUPPLY: Performed by: INTERNAL MEDICINE

## 2024-10-16 RX ORDER — MIDAZOLAM HYDROCHLORIDE 1 MG/ML
INJECTION, SOLUTION INTRAMUSCULAR; INTRAVENOUS PRN
Status: DISCONTINUED | OUTPATIENT
Start: 2024-10-16 | End: 2024-10-16 | Stop reason: HOSPADM

## 2024-10-16 RX ORDER — ONDANSETRON 2 MG/ML
4 INJECTION INTRAMUSCULAR; INTRAVENOUS ONCE
Status: COMPLETED | OUTPATIENT
Start: 2024-10-16 | End: 2024-10-16

## 2024-10-16 RX ORDER — SODIUM CHLORIDE 9 MG/ML
INJECTION, SOLUTION INTRAVENOUS CONTINUOUS PRN
Status: COMPLETED | OUTPATIENT
Start: 2024-10-16 | End: 2024-10-16

## 2024-10-16 RX ORDER — HYDRALAZINE HYDROCHLORIDE 20 MG/ML
10 INJECTION INTRAMUSCULAR; INTRAVENOUS EVERY 10 MIN PRN
Status: DISCONTINUED | OUTPATIENT
Start: 2024-10-16 | End: 2024-10-20 | Stop reason: HOSPADM

## 2024-10-16 RX ORDER — GLUCAGON 1 MG/ML
1 KIT INJECTION PRN
Status: DISCONTINUED | OUTPATIENT
Start: 2024-10-16 | End: 2024-10-20 | Stop reason: HOSPADM

## 2024-10-16 RX ORDER — DEXTROSE MONOHYDRATE 100 MG/ML
INJECTION, SOLUTION INTRAVENOUS CONTINUOUS PRN
Status: DISCONTINUED | OUTPATIENT
Start: 2024-10-16 | End: 2024-10-20 | Stop reason: HOSPADM

## 2024-10-16 RX ORDER — HEPARIN SODIUM 1000 [USP'U]/ML
2000 INJECTION, SOLUTION INTRAVENOUS; SUBCUTANEOUS PRN
Status: DISCONTINUED | OUTPATIENT
Start: 2024-10-16 | End: 2024-10-16

## 2024-10-16 RX ORDER — SODIUM CHLORIDE 9 MG/ML
INJECTION, SOLUTION INTRAVENOUS PRN
Status: DISCONTINUED | OUTPATIENT
Start: 2024-10-16 | End: 2024-10-20 | Stop reason: HOSPADM

## 2024-10-16 RX ORDER — HEPARIN SODIUM 10000 [USP'U]/100ML
5-30 INJECTION, SOLUTION INTRAVENOUS CONTINUOUS
Status: DISCONTINUED | OUTPATIENT
Start: 2024-10-16 | End: 2024-10-16

## 2024-10-16 RX ORDER — ONDANSETRON 2 MG/ML
INJECTION INTRAMUSCULAR; INTRAVENOUS
Status: COMPLETED
Start: 2024-10-16 | End: 2024-10-16

## 2024-10-16 RX ORDER — PROCHLORPERAZINE EDISYLATE 5 MG/ML
10 INJECTION INTRAMUSCULAR; INTRAVENOUS EVERY 6 HOURS PRN
Status: DISCONTINUED | OUTPATIENT
Start: 2024-10-16 | End: 2024-10-20 | Stop reason: HOSPADM

## 2024-10-16 RX ORDER — ONDANSETRON 2 MG/ML
INJECTION INTRAMUSCULAR; INTRAVENOUS PRN
Status: DISCONTINUED | OUTPATIENT
Start: 2024-10-16 | End: 2024-10-16 | Stop reason: HOSPADM

## 2024-10-16 RX ORDER — HEPARIN SODIUM 1000 [USP'U]/ML
4000 INJECTION, SOLUTION INTRAVENOUS; SUBCUTANEOUS ONCE
Status: COMPLETED | OUTPATIENT
Start: 2024-10-16 | End: 2024-10-16

## 2024-10-16 RX ORDER — ASPIRIN 325 MG
TABLET ORAL PRN
Status: DISCONTINUED | OUTPATIENT
Start: 2024-10-16 | End: 2024-10-16 | Stop reason: HOSPADM

## 2024-10-16 RX ORDER — SODIUM CHLORIDE 0.9 % (FLUSH) 0.9 %
5-40 SYRINGE (ML) INJECTION PRN
Status: DISCONTINUED | OUTPATIENT
Start: 2024-10-16 | End: 2024-10-20 | Stop reason: HOSPADM

## 2024-10-16 RX ORDER — PANTOPRAZOLE SODIUM 40 MG/10ML
40 INJECTION, POWDER, LYOPHILIZED, FOR SOLUTION INTRAVENOUS 2 TIMES DAILY
Status: DISCONTINUED | OUTPATIENT
Start: 2024-10-17 | End: 2024-10-20 | Stop reason: HOSPADM

## 2024-10-16 RX ORDER — DICYCLOMINE HYDROCHLORIDE 10 MG/ML
20 INJECTION INTRAMUSCULAR ONCE
Status: DISCONTINUED | OUTPATIENT
Start: 2024-10-16 | End: 2024-10-16

## 2024-10-16 RX ORDER — SODIUM CHLORIDE 0.9 % (FLUSH) 0.9 %
5-40 SYRINGE (ML) INJECTION EVERY 12 HOURS SCHEDULED
Status: DISCONTINUED | OUTPATIENT
Start: 2024-10-16 | End: 2024-10-20 | Stop reason: HOSPADM

## 2024-10-16 RX ORDER — INSULIN LISPRO 100 [IU]/ML
0-4 INJECTION, SOLUTION INTRAVENOUS; SUBCUTANEOUS
Status: DISCONTINUED | OUTPATIENT
Start: 2024-10-16 | End: 2024-10-20 | Stop reason: HOSPADM

## 2024-10-16 RX ORDER — 0.9 % SODIUM CHLORIDE 0.9 %
1000 INTRAVENOUS SOLUTION INTRAVENOUS ONCE
Status: COMPLETED | OUTPATIENT
Start: 2024-10-16 | End: 2024-10-16

## 2024-10-16 RX ORDER — KETOROLAC TROMETHAMINE 30 MG/ML
15 INJECTION, SOLUTION INTRAMUSCULAR; INTRAVENOUS ONCE
Status: COMPLETED | OUTPATIENT
Start: 2024-10-16 | End: 2024-10-16

## 2024-10-16 RX ORDER — ONDANSETRON 2 MG/ML
4 INJECTION INTRAMUSCULAR; INTRAVENOUS EVERY 6 HOURS PRN
Status: DISCONTINUED | OUTPATIENT
Start: 2024-10-16 | End: 2024-10-16

## 2024-10-16 RX ORDER — IOPAMIDOL 755 MG/ML
INJECTION, SOLUTION INTRAVASCULAR PRN
Status: DISCONTINUED | OUTPATIENT
Start: 2024-10-16 | End: 2024-10-16 | Stop reason: HOSPADM

## 2024-10-16 RX ORDER — SODIUM CHLORIDE 9 MG/ML
INJECTION, SOLUTION INTRAVENOUS CONTINUOUS
Status: ACTIVE | OUTPATIENT
Start: 2024-10-16 | End: 2024-10-16

## 2024-10-16 RX ORDER — HEPARIN SODIUM 1000 [USP'U]/ML
4000 INJECTION, SOLUTION INTRAVENOUS; SUBCUTANEOUS PRN
Status: DISCONTINUED | OUTPATIENT
Start: 2024-10-16 | End: 2024-10-16

## 2024-10-16 RX ORDER — IOPAMIDOL 755 MG/ML
75 INJECTION, SOLUTION INTRAVASCULAR
Status: COMPLETED | OUTPATIENT
Start: 2024-10-16 | End: 2024-10-16

## 2024-10-16 RX ADMIN — NITROGLYCERIN 0.5 INCH: 20 OINTMENT TOPICAL at 10:50

## 2024-10-16 RX ADMIN — POTASSIUM BICARBONATE 40 MEQ: 782 TABLET, EFFERVESCENT ORAL at 10:41

## 2024-10-16 RX ADMIN — HYDROMORPHONE HYDROCHLORIDE 0.5 MG: 1 INJECTION, SOLUTION INTRAMUSCULAR; INTRAVENOUS; SUBCUTANEOUS at 08:33

## 2024-10-16 RX ADMIN — IOPAMIDOL 75 ML: 755 INJECTION, SOLUTION INTRAVENOUS at 08:55

## 2024-10-16 RX ADMIN — ONDANSETRON 4 MG: 2 INJECTION INTRAMUSCULAR; INTRAVENOUS at 08:33

## 2024-10-16 RX ADMIN — HEPARIN SODIUM 4000 UNITS: 1000 INJECTION INTRAVENOUS; SUBCUTANEOUS at 11:16

## 2024-10-16 RX ADMIN — KETOROLAC TROMETHAMINE 15 MG: 30 INJECTION, SOLUTION INTRAMUSCULAR at 10:41

## 2024-10-16 RX ADMIN — HYDROMORPHONE HYDROCHLORIDE 0.5 MG: 1 INJECTION, SOLUTION INTRAMUSCULAR; INTRAVENOUS; SUBCUTANEOUS at 09:58

## 2024-10-16 RX ADMIN — HEPARIN SODIUM 12 UNITS/KG/HR: 10000 INJECTION, SOLUTION INTRAVENOUS at 11:18

## 2024-10-16 RX ADMIN — ONDANSETRON 4 MG: 2 INJECTION INTRAMUSCULAR; INTRAVENOUS at 17:38

## 2024-10-16 RX ADMIN — PANTOPRAZOLE SODIUM 80 MG: 40 INJECTION, POWDER, FOR SOLUTION INTRAVENOUS at 18:32

## 2024-10-16 RX ADMIN — SODIUM CHLORIDE 1000 ML: 9 INJECTION, SOLUTION INTRAVENOUS at 08:33

## 2024-10-16 RX ADMIN — SODIUM CHLORIDE, PRESERVATIVE FREE 20 ML: 5 INJECTION INTRAVENOUS at 20:17

## 2024-10-16 ASSESSMENT — PAIN DESCRIPTION - DESCRIPTORS
DESCRIPTORS: DISCOMFORT
DESCRIPTORS: ACHING;DISCOMFORT
DESCRIPTORS: DISCOMFORT
DESCRIPTORS: DISCOMFORT
DESCRIPTORS: CRAMPING;DISCOMFORT
DESCRIPTORS: DISCOMFORT

## 2024-10-16 ASSESSMENT — PAIN DESCRIPTION - ORIENTATION
ORIENTATION: MID;UPPER
ORIENTATION: MID;UPPER
ORIENTATION: UPPER;MID
ORIENTATION: MID;UPPER
ORIENTATION: MID
ORIENTATION: MID

## 2024-10-16 ASSESSMENT — PAIN SCALES - GENERAL
PAINLEVEL_OUTOF10: 8
PAINLEVEL_OUTOF10: 9
PAINLEVEL_OUTOF10: 4

## 2024-10-16 ASSESSMENT — PAIN DESCRIPTION - LOCATION
LOCATION: ABDOMEN

## 2024-10-16 NOTE — ED PROVIDER NOTES
I personally saw the patient and made/approved the management plan and take responsibility for the patient management.    History: The patient is a 54-year-old female who presents to the emergency department due to abdominal pain nausea and vomiting which began several hours prior to presentation.  Patient reports that she is currently taking Ozempic.     Exam: Pleasant middle-aged  female.  Generalized abdominal tenderness to palpation most prominent epigastric area.  No lower abdominal tenderness.  No rebound guarding or peritoneal signs.    MDM: Initial troponin mildly elevated to 49 with nonspecific ST depression in anterior leads.  Repeat troponin is 265 and repeat EKG shows newly inverted T waves in lateral leads but still clearly does not meet STEMI criteria.  Patient is started on heparin and cardiology is consulted who agrees to take the patient urgently to the Cath Lab for concerns of NSTEMI.  CT abdomen pelvis was performed during the patient's ED workup and shows diffuse small bowel mucosal thickening possibly indicating colitis but no acute emergent abdominal pelvic pathology.  Patient admitted for further medical evaluation and care.    The Ekg interpreted by me shows  normal sinus rhythm with a rate of 72  Axis is   Normal  QTc is   492ms  Intervals and Durations are unremarkable.      ST Segments: nonspecific changes  ST depression anterior leads new from previous EKG on 8/12/2019      The Repeat Ekg interpreted by me shows  normal sinus rhythm with a rate of 74  Axis is   Normal  QTc is   501ms  Intervals and Durations are unremarkable.      ST Segments: nonspecific changes  ST depression anterior leads has resolved however there are new T wave inversions in lateral leads from previous EKG dated earlier today      For further details of this patient's emergency department encounter, please see the advanced practice provider's documentation.    FINAL IMPRESSION      1. NSTEMI (non-ST 
Albumin/Globulin Ratio 1.9 1.1 - 2.2    Total Bilirubin 0.4 0.0 - 1.0 mg/dL    Alkaline Phosphatase 77 40 - 129 U/L    ALT 14 10 - 40 U/L    AST 17 15 - 37 U/L   Urinalysis   Result Value Ref Range    Color, UA Yellow Straw/Yellow    Clarity, UA Clear Clear    Glucose, Ur 100 (A) Negative mg/dL    Bilirubin, Urine Negative Negative    Ketones, Urine 15 (A) Negative mg/dL    Specific Gravity, UA 1.025 1.005 - 1.030    Blood, Urine MODERATE (A) Negative    pH, Urine 6.5 5.0 - 8.0    Protein, UA TRACE (A) Negative mg/dL    Urobilinogen, Urine 0.2 <2.0 E.U./dL    Nitrite, Urine Negative Negative    Leukocyte Esterase, Urine SMALL (A) Negative    Microscopic Examination YES     Urine Type NotGiven    Troponin   Result Value Ref Range    Troponin, High Sensitivity 49 (H) 0 - 14 ng/L   Microscopic Urinalysis   Result Value Ref Range    WBC, UA None seen 0 - 5 /HPF    RBC, UA None seen 0 - 4 /HPF   Lipase   Result Value Ref Range    Lipase 36.0 13.0 - 60.0 U/L   Troponin   Result Value Ref Range    Troponin, High Sensitivity 265 (H) 0 - 14 ng/L   APTT   Result Value Ref Range    aPTT 26.8 22.1 - 36.4 sec   EKG 12 Lead   Result Value Ref Range    Ventricular Rate 72 BPM    Atrial Rate 72 BPM    P-R Interval 146 ms    QRS Duration 86 ms    Q-T Interval 450 ms    QTc Calculation (Bazett) 492 ms    P Axis 57 degrees    R Axis 52 degrees    T Axis 72 degrees    Diagnosis       Normal sinus rhythmNonspecific ST abnormalityProlonged QTAbnormal ECGWhen compared with ECG of 12-AUG-2019 15:53,ST now depressed in Anterior leadsT wave inversion no longer evident in Lateral leadsQT has lengthenedConfirmed by JANIS LINDER ([id number]) on 10/16/2024 9:26:57 AM     EKG 12 Lead   Result Value Ref Range    Ventricular Rate 74 BPM    Atrial Rate 74 BPM    P-R Interval 162 ms    QRS Duration 96 ms    Q-T Interval 452 ms    QTc Calculation (Bazett) 501 ms    P Axis 63 degrees    R Axis 45 degrees    T Axis 130 degrees    Diagnosis       Normal

## 2024-10-16 NOTE — ED NOTES
Patient   and 19 yo autistic son in lobby fighting and rolling around. Isaac Carr called. Son ran out front door and sitting on steps of ED with father. Jarrett and HAMILTON outside talking with family. I walked outside to update family members. Patient updated on family going home and will receive updates via telephone.

## 2024-10-16 NOTE — ED NOTES
ED CARDIOLOGY CONSULT  RE-NSTEMI  [phone]-paged [facility] cardio  1113- returned page, transferred to Jammie CABRERA

## 2024-10-16 NOTE — POST SEDATION
Patient:  Jessica Cornelius   :   1970    A pre-sedation re-evaluation was performed immediately at the end of the procedure.  Procedure:  Cardiac cath  Medications: Procedural sedation with minimal conscious sedation  Complications: None  Estimated Blood Loss: none  Specimens: Were not obtained        Martha Medication and Procedural Reconciliation:  I agree that the documented medications and procedures performed are true.  The medications were given under my order.  The procedures were performed under my direct supervision.     An immediate re-assessment was completed prior to sedation, and it is determined to be safe to proceed.

## 2024-10-16 NOTE — H&P
diverticulosis.  There is a trace amount of free fluid in the pelvis and surrounding the bladder.  The stomach is unremarkable. Pelvis: Hysterectomy.  The bladder is distended no stones or wall thickening. Peritoneum/Retroperitoneum: Mild amount of free fluid.  No obvious free air. Bones/Soft Tissues: Unremarkable.     1. Diffuse small bowel mucosal thickening with fluid distension of the distal small bowel and mild increased signal throughout the small bowel mesentery. Diffuse thickening of the sigmoid mucosa with underlying diverticulosis. Findings are consistent with enteritis, colitis, or combination.  Small amount of free fluid but no focal abscess.. 2. Tiny nonobstructing right renal stone. 3. Post cholecystectomy with intrahepatic ductal dilatation unchanged. 4. Hysterectomy.       PCP: Endy Alarcon DO    Past Medical History:        Diagnosis Date    Arthritis     Depression     Elbow injury     CASTED     Fractures     ELBOW AND WRIST    Headache     Hypertension     Wrist fracture     AS CHILD       Past Surgical History:        Procedure Laterality Date    BUNIONECTOMY      CARPAL TUNNEL RELEASE      bilateral    CERVICAL FUSION      X3  NECK PROCEDURE LAST    PROCEDURE  TORN JUGULAR    CHOLECYSTECTOMY      CYST REMOVAL      left wrist x 2    HYSTERECTOMY (CERVIX STATUS UNKNOWN)      OTHER SURGICAL HISTORY Left 05/03/2017    Gastroc recession left    OTHER SURGICAL HISTORY Left 11/21/2017    3RD METATARSAL OSTEOTOMY, NEUROMA EXCISION SECON   THIRD    AZ REMOVAL IMPLANT DEEP Left 9/26/2018    REMOVAL OF PAINFUL RETAINED HARDWARE LEFT FOOT performed by Cem Jeffrey DPM at [facility] OR    SALIVARY GLAND SURGERY      removal of benign cyst       Medications Prior to Admission:   Prior to Admission medications    Medication Sig Start Date End Date Taking? Authorizing Provider   FLUoxetine (PROZAC) 40 MG capsule TAKE 1 CAPSULE BY MOUTH DAILY 9/9/24   Endy Alarcon DO   gabapentin (NEURONTIN)

## 2024-10-16 NOTE — PROCEDURES
Post Cardiac Catheterization Note.  Full details available in procedure log    DATE: 10/16/2024  PATIENT: Jessica Cornelius  MRN: [id number]    Procedure Performed:  [facility]  CORS  LVG    Procedure Findings:  Normal cors  Apical ballooning with EF of about 30% consistent with Takotsubo's cardiomyopathy    Conclusion/Recommendations:  Takotsubo syndrome    Ira Calero  Interventional Cardiology  [facility]  Office [phone]

## 2024-10-16 NOTE — PRE SEDATION
Sedation Plan  ASA: class 3 - patient with severe systemic disease     Mallampati class: III - soft palate, base of uvula visible.    Sedation plan: local anesthesia and level 2-1: moderate/analgesia (conscious sedation)    Risks, benefits, and alternatives discussed with patient.        Immediate reassessment prior to sedation:  Patient's status reviewed and vital signs assessed; acceptable to perform procedure and proceed to administer sedation as planned.      Brief Pre-Op Note/Sedation Assessment      Jessica Cornelius  1970  [id number]  12:23 PM    Planned Procedure: Cardiac Catheterization Procedure  Post Procedure Plan: Return to same level of care  Consent: I have discussed with the patient and/or the patient representative the indication, alternatives, and the possible risks and/or complications of the planned procedure and the anesthesia methods. The patient and/or patient representative appear to understand and agree to proceed.        Chief Complaint:   NSTEMI      Indications for Cath Procedure:  Presentation:  ACS <= 24 hrs and New Onset Angina <= 2 months  2.  Anginal Classification within 2 weeks:  CCS IV - Inability to perform any activity without angina or angina at rest, i.e., severe limitation  3.  Angina Symptoms Assessment:  Atypical Chest Pain  4.  Heart Failure Class within last 2 weeks:  No symptoms  5.  Cardiovascular Instability:  Yes:  Persistent ischemic symptoms (CP, ST Elevation)    Prior Ischemic Workup/Eval:  Pre-Procedural Medications: Yes: Aspirin  2.   Stress Test Completed?  No    Does Patient need surgery?  Cath Valve Surgery:  No    Pre-Procedure Medical History:  Vital Signs:  /83   Pulse 76   Temp 97.6 °F (36.4 °C)   Resp 17   Ht 1.727 m (5' 8\")   Wt 74.8 kg (165 lb)   SpO2 98%   BMI 25.09 kg/m²     Allergies:    Allergies   Allergen Reactions    Methocarbamol Nausea And Vomiting     SEVERE  SEVERE    Morphine Hives and Rash     HIVES  HIVES    Morphine And

## 2024-10-16 NOTE — PLAN OF CARE
Cross Cover note:  Called for new rectal bleeding (BRBPR) in setting of NSTEMI and Heparin use.  Angiogram today showed normal coronaries but suspicious for Takutsubo's Cardiomyopathy. Heparin drip was already discontinued.   She continues to have epigastric pain and active vomiting.   Recommend following serial Hb and GI consultation.   Given hemodynamic stability, rapid upper GI bleeding is less likely. However, would be reasonable to start IV Protonix BID.

## 2024-10-17 ENCOUNTER — APPOINTMENT (OUTPATIENT)
Age: 54
DRG: 871 | End: 2024-10-17
Payer: COMMERCIAL

## 2024-10-17 PROBLEM — I10 PRIMARY HYPERTENSION: Status: ACTIVE | Noted: 2024-10-17

## 2024-10-17 PROBLEM — K62.5 RECTAL BLEEDING: Status: ACTIVE | Noted: 2024-10-17

## 2024-10-17 PROBLEM — I21.4 NSTEMI (NON-ST ELEVATED MYOCARDIAL INFARCTION) (HCC): Status: ACTIVE | Noted: 2024-10-17

## 2024-10-17 PROBLEM — R10.13 ABDOMINAL PAIN, EPIGASTRIC: Status: ACTIVE | Noted: 2024-10-17

## 2024-10-17 LAB
ANION GAP SERPL CALCULATED.3IONS-SCNC: 13 MMOL/L (ref 3–16)
APTT BLD: 24.9 SEC (ref 22.1–36.4)
BACTERIA URNS QL MICRO: ABNORMAL /HPF
BILIRUB UR QL STRIP.AUTO: NEGATIVE
BUN SERPL-MCNC: 31 MG/DL (ref 7–20)
CALCIUM SERPL-MCNC: 8.3 MG/DL (ref 8.3–10.6)
CHLORIDE SERPL-SCNC: 99 MMOL/L (ref 99–110)
CLARITY UR: ABNORMAL
CO2 SERPL-SCNC: 22 MMOL/L (ref 21–32)
COLOR UR: YELLOW
CREAT SERPL-MCNC: 0.7 MG/DL (ref 0.6–1.1)
DEPRECATED RDW RBC AUTO: 13.5 % (ref 12.4–15.4)
ECHO AO ASC DIAM: 3.4 CM
ECHO AO ASCENDING AORTA INDEX: 1.81 CM/M2
ECHO AO ROOT DIAM: 3.6 CM
ECHO AO ROOT INDEX: 1.91 CM/M2
ECHO AV AREA PEAK VELOCITY: 3.3 CM2
ECHO AV AREA VTI: 3.2 CM2
ECHO AV AREA/BSA PEAK VELOCITY: 1.8 CM2/M2
ECHO AV AREA/BSA VTI: 1.7 CM2/M2
ECHO AV CUSP MM: 2 CM
ECHO AV MEAN GRADIENT: 3 MMHG
ECHO AV MEAN VELOCITY: 0.8 M/S
ECHO AV PEAK GRADIENT: 6 MMHG
ECHO AV PEAK VELOCITY: 1.3 M/S
ECHO AV VELOCITY RATIO: 0.85
ECHO AV VTI: 18.9 CM
ECHO BSA: 1.89 M2
ECHO EST RA PRESSURE: 8 MMHG
ECHO LA AREA 2C: 19.5 CM2
ECHO LA AREA 4C: 17 CM2
ECHO LA DIAMETER INDEX: 1.17 CM/M2
ECHO LA DIAMETER: 2.2 CM
ECHO LA MAJOR AXIS: 5.4 CM
ECHO LA MINOR AXIS: 5.6 CM
ECHO LA TO AORTIC ROOT RATIO: 0.61
ECHO LA VOL BP: 49 ML (ref 22–52)
ECHO LA VOL MOD A2C: 55 ML (ref 22–52)
ECHO LA VOL MOD A4C: 42 ML (ref 22–52)
ECHO LA VOL/BSA BIPLANE: 26 ML/M2 (ref 16–34)
ECHO LA VOLUME INDEX MOD A2C: 29 ML/M2 (ref 16–34)
ECHO LA VOLUME INDEX MOD A4C: 22 ML/M2 (ref 16–34)
ECHO LV E' LATERAL VELOCITY: 11.4 CM/S
ECHO LV E' SEPTAL VELOCITY: 4.7 CM/S
ECHO LV EDV 3D: 133 ML
ECHO LV EDV INDEX 3D: 71 ML/M2
ECHO LV EF PHYSICIAN: 27 %
ECHO LV EJECTION FRACTION 3D: 25 %
ECHO LV ESV 3D: 99 ML
ECHO LV ESV INDEX 3D: 53 ML/M2
ECHO LV FRACTIONAL SHORTENING: 15 % (ref 28–44)
ECHO LV INTERNAL DIMENSION DIASTOLE INDEX: 2.93 CM/M2
ECHO LV INTERNAL DIMENSION DIASTOLIC: 5.5 CM (ref 3.9–5.3)
ECHO LV INTERNAL DIMENSION SYSTOLIC INDEX: 2.5 CM/M2
ECHO LV INTERNAL DIMENSION SYSTOLIC: 4.7 CM
ECHO LV IVSD: 0.9 CM (ref 0.6–0.9)
ECHO LV MASS 2D: 172.7 G (ref 67–162)
ECHO LV MASS 3D INDEX: 93.6 G/M2
ECHO LV MASS 3D: 176 G
ECHO LV MASS INDEX 2D: 91.9 G/M2 (ref 43–95)
ECHO LV POSTERIOR WALL DIASTOLIC: 0.8 CM (ref 0.6–0.9)
ECHO LV RELATIVE WALL THICKNESS RATIO: 0.29
ECHO LVOT AREA: 3.8 CM2
ECHO LVOT AV VTI INDEX: 0.84
ECHO LVOT DIAM: 2.2 CM
ECHO LVOT MEAN GRADIENT: 2 MMHG
ECHO LVOT PEAK GRADIENT: 5 MMHG
ECHO LVOT PEAK VELOCITY: 1.1 M/S
ECHO LVOT STROKE VOLUME INDEX: 32.1 ML/M2
ECHO LVOT SV: 60.4 ML
ECHO LVOT VTI: 15.9 CM
ECHO RA AREA 4C: 11.8 CM2
ECHO RA END SYSTOLIC VOLUME APICAL 4 CHAMBER INDEX BSA: 15 ML/M2
ECHO RA VOLUME: 28 ML
ECHO RIGHT VENTRICULAR SYSTOLIC PRESSURE (RVSP): 12 MMHG
ECHO RV FREE WALL PEAK S': 11.2 CM/S
ECHO RV TAPSE: 1.8 CM (ref 1.7–?)
ECHO TV REGURGITANT MAX VELOCITY: 1.02 M/S
ECHO TV REGURGITANT PEAK GRADIENT: 4 MMHG
EPI CELLS #/AREA URNS HPF: ABNORMAL /HPF (ref 0–5)
EST. AVERAGE GLUCOSE BLD GHB EST-MCNC: 93.9 MG/DL
GFR SERPLBLD CREATININE-BSD FMLA CKD-EPI: >90 ML/MIN/{1.73_M2}
GLUCOSE BLD-MCNC: 125 MG/DL (ref 70–99)
GLUCOSE BLD-MCNC: 132 MG/DL (ref 70–99)
GLUCOSE BLD-MCNC: 154 MG/DL (ref 70–99)
GLUCOSE BLD-MCNC: 161 MG/DL (ref 70–99)
GLUCOSE SERPL-MCNC: 190 MG/DL (ref 70–99)
GLUCOSE UR STRIP.AUTO-MCNC: NEGATIVE MG/DL
HBA1C MFR BLD: 4.9 %
HCT VFR BLD AUTO: 42.9 % (ref 36–48)
HGB BLD-MCNC: 13.6 G/DL (ref 12–16)
HGB BLD-MCNC: 13.7 G/DL (ref 12–16)
HGB BLD-MCNC: 13.9 G/DL (ref 12–16)
HGB BLD-MCNC: 14.3 G/DL (ref 12–16)
HGB BLD-MCNC: 14.8 G/DL (ref 12–16)
HGB UR QL STRIP.AUTO: ABNORMAL
INR PPP: 0.92 (ref 0.85–1.15)
KETONES UR STRIP.AUTO-MCNC: NEGATIVE MG/DL
LACTATE BLDV-SCNC: 3 MMOL/L (ref 0.4–2)
LEUKOCYTE ESTERASE UR QL STRIP.AUTO: NEGATIVE
LIPASE SERPL-CCNC: 11 U/L (ref 13–60)
MCH RBC QN AUTO: 31.9 PG (ref 26–34)
MCHC RBC AUTO-ENTMCNC: 33.4 G/DL (ref 31–36)
MCV RBC AUTO: 95.4 FL (ref 80–100)
MIXING STUDIES PPP-IMP: NO
NITRITE UR QL STRIP.AUTO: NEGATIVE
PERFORMED ON: ABNORMAL
PH UR STRIP.AUTO: 6 [PH] (ref 5–8)
PLATELET # BLD AUTO: 298 K/UL (ref 135–450)
PMV BLD AUTO: 9 FL (ref 5–10.5)
POTASSIUM SERPL-SCNC: 3.9 MMOL/L (ref 3.5–5.1)
PROCALCITONIN SERPL IA-MCNC: 0.44 NG/ML (ref 0–0.15)
PROT UR STRIP.AUTO-MCNC: 100 MG/DL
PROTHROMBIN TIME: 12.6 SEC (ref 11.9–14.9)
RBC # BLD AUTO: 4.49 M/UL (ref 4–5.2)
RBC #/AREA URNS HPF: ABNORMAL /HPF (ref 0–4)
SODIUM SERPL-SCNC: 134 MMOL/L (ref 136–145)
SP GR UR STRIP.AUTO: >=1.03 (ref 1–1.03)
THROMBIN TIME: 17.7 SECS (ref 14.8–20)
UA COMPLETE W REFLEX CULTURE PNL UR: ABNORMAL
UA DIPSTICK W REFLEX MICRO PNL UR: YES
URN SPEC COLLECT METH UR: ABNORMAL
UROBILINOGEN UR STRIP-ACNC: 0.2 E.U./DL
WBC # BLD AUTO: 22.1 K/UL (ref 4–11)
WBC #/AREA URNS HPF: ABNORMAL /HPF (ref 0–5)
YEAST URNS QL MICRO: PRESENT /HPF

## 2024-10-17 PROCEDURE — 6370000000 HC RX 637 (ALT 250 FOR IP): Performed by: NURSE PRACTITIONER

## 2024-10-17 PROCEDURE — 84145 PROCALCITONIN (PCT): CPT

## 2024-10-17 PROCEDURE — 6360000004 HC RX CONTRAST MEDICATION: Performed by: NURSE PRACTITIONER

## 2024-10-17 PROCEDURE — 83036 HEMOGLOBIN GLYCOSYLATED A1C: CPT

## 2024-10-17 PROCEDURE — 2580000003 HC RX 258: Performed by: INTERNAL MEDICINE

## 2024-10-17 PROCEDURE — 76376 3D RENDER W/INTRP POSTPROCES: CPT | Performed by: INTERNAL MEDICINE

## 2024-10-17 PROCEDURE — 6360000002 HC RX W HCPCS: Performed by: INTERNAL MEDICINE

## 2024-10-17 PROCEDURE — 83605 ASSAY OF LACTIC ACID: CPT

## 2024-10-17 PROCEDURE — 85018 HEMOGLOBIN: CPT

## 2024-10-17 PROCEDURE — 6370000000 HC RX 637 (ALT 250 FOR IP)

## 2024-10-17 PROCEDURE — 1200000000 HC SEMI PRIVATE

## 2024-10-17 PROCEDURE — 87324 CLOSTRIDIUM AG IA: CPT

## 2024-10-17 PROCEDURE — 93306 TTE W/DOPPLER COMPLETE: CPT | Performed by: INTERNAL MEDICINE

## 2024-10-17 PROCEDURE — 99232 SBSQ HOSP IP/OBS MODERATE 35: CPT | Performed by: NURSE PRACTITIONER

## 2024-10-17 PROCEDURE — C8929 TTE W OR WO FOL WCON,DOPPLER: HCPCS

## 2024-10-17 PROCEDURE — 6370000000 HC RX 637 (ALT 250 FOR IP): Performed by: INTERNAL MEDICINE

## 2024-10-17 PROCEDURE — 93356 MYOCRD STRAIN IMG SPCKL TRCK: CPT | Performed by: INTERNAL MEDICINE

## 2024-10-17 PROCEDURE — 87506 IADNA-DNA/RNA PROBE TQ 6-11: CPT

## 2024-10-17 PROCEDURE — 36415 COLL VENOUS BLD VENIPUNCTURE: CPT

## 2024-10-17 PROCEDURE — 80048 BASIC METABOLIC PNL TOTAL CA: CPT

## 2024-10-17 PROCEDURE — 83690 ASSAY OF LIPASE: CPT

## 2024-10-17 PROCEDURE — 81001 URINALYSIS AUTO W/SCOPE: CPT

## 2024-10-17 PROCEDURE — 87493 C DIFF AMPLIFIED PROBE: CPT

## 2024-10-17 PROCEDURE — 87449 NOS EACH ORGANISM AG IA: CPT

## 2024-10-17 PROCEDURE — 85027 COMPLETE CBC AUTOMATED: CPT

## 2024-10-17 RX ORDER — METOPROLOL SUCCINATE 25 MG/1
25 TABLET, EXTENDED RELEASE ORAL DAILY
Status: DISCONTINUED | OUTPATIENT
Start: 2024-10-17 | End: 2024-10-18

## 2024-10-17 RX ORDER — ACETAMINOPHEN 325 MG/1
650 TABLET ORAL EVERY 4 HOURS PRN
Status: DISCONTINUED | OUTPATIENT
Start: 2024-10-17 | End: 2024-10-20 | Stop reason: HOSPADM

## 2024-10-17 RX ORDER — GABAPENTIN 400 MG/1
800 CAPSULE ORAL 3 TIMES DAILY
Status: DISCONTINUED | OUTPATIENT
Start: 2024-10-17 | End: 2024-10-20 | Stop reason: HOSPADM

## 2024-10-17 RX ORDER — OXYCODONE AND ACETAMINOPHEN 5; 325 MG/1; MG/1
1 TABLET ORAL EVERY 4 HOURS PRN
Status: COMPLETED | OUTPATIENT
Start: 2024-10-17 | End: 2024-10-17

## 2024-10-17 RX ADMIN — GABAPENTIN 800 MG: 400 CAPSULE ORAL at 13:53

## 2024-10-17 RX ADMIN — OXYCODONE AND ACETAMINOPHEN 1 TABLET: 5; 325 TABLET ORAL at 22:38

## 2024-10-17 RX ADMIN — SODIUM CHLORIDE, PRESERVATIVE FREE 20 ML: 5 INJECTION INTRAVENOUS at 08:59

## 2024-10-17 RX ADMIN — PERFLUTREN 1.5 ML: 6.52 INJECTION, SUSPENSION INTRAVENOUS at 13:28

## 2024-10-17 RX ADMIN — OXYCODONE AND ACETAMINOPHEN 1 TABLET: 5; 325 TABLET ORAL at 18:29

## 2024-10-17 RX ADMIN — PANTOPRAZOLE SODIUM 40 MG: 40 INJECTION, POWDER, FOR SOLUTION INTRAVENOUS at 08:59

## 2024-10-17 RX ADMIN — PIPERACILLIN AND TAZOBACTAM 4500 MG: 4; .5 INJECTION, POWDER, FOR SOLUTION INTRAVENOUS; PARENTERAL at 13:57

## 2024-10-17 RX ADMIN — PANTOPRAZOLE SODIUM 40 MG: 40 INJECTION, POWDER, FOR SOLUTION INTRAVENOUS at 19:45

## 2024-10-17 RX ADMIN — GABAPENTIN 800 MG: 400 CAPSULE ORAL at 19:45

## 2024-10-17 RX ADMIN — ACETAMINOPHEN 650 MG: 325 TABLET ORAL at 06:04

## 2024-10-17 RX ADMIN — FLUOXETINE HYDROCHLORIDE 40 MG: 20 CAPSULE ORAL at 10:50

## 2024-10-17 RX ADMIN — METOPROLOL SUCCINATE 25 MG: 25 TABLET, FILM COATED, EXTENDED RELEASE ORAL at 13:53

## 2024-10-17 RX ADMIN — GABAPENTIN 800 MG: 400 CAPSULE ORAL at 10:50

## 2024-10-17 RX ADMIN — PIPERACILLIN AND TAZOBACTAM 3375 MG: 3; .375 INJECTION, POWDER, FOR SOLUTION INTRAVENOUS at 18:28

## 2024-10-17 ASSESSMENT — PAIN SCALES - GENERAL
PAINLEVEL_OUTOF10: 5
PAINLEVEL_OUTOF10: 7
PAINLEVEL_OUTOF10: 0
PAINLEVEL_OUTOF10: 1
PAINLEVEL_OUTOF10: 6
PAINLEVEL_OUTOF10: 5

## 2024-10-17 ASSESSMENT — PAIN DESCRIPTION - LOCATION
LOCATION: ABDOMEN

## 2024-10-17 ASSESSMENT — PAIN DESCRIPTION - ORIENTATION
ORIENTATION: MID

## 2024-10-17 ASSESSMENT — PAIN DESCRIPTION - DESCRIPTORS
DESCRIPTORS: SHARP
DESCRIPTORS: ACHING
DESCRIPTORS: ACHING

## 2024-10-17 ASSESSMENT — PAIN - FUNCTIONAL ASSESSMENT: PAIN_FUNCTIONAL_ASSESSMENT: ACTIVITIES ARE NOT PREVENTED

## 2024-10-17 NOTE — CONSULTS
[facility]   Cardiovascular Evaluation    PATIENT: Jessica Cornelius  DATE: 10/16/2024  MRN: [id number]  CSN: [id number]  : 1970    Primary Care Doctor/Referring provider: Endy Alarcon DO, Charles Adam Setser, MD     Reason for evaluation/Chief complaint:   Abdominal Pain (Mid-abdominal pain that started a few hours ago, gallbladder previously removed, +N/V/D)      Subjective:    History of present illness on initial date of evaluation:   Jessica Cornelius is a 54 y.o. patient who presents to the hospital further evaluation of the mid abdominal pain.  Patient states that she woke up this morning with pain that was severe in nature and initially noticed in the mid epigastric area and radiated into the back.  This prompted the patient to seek medical evaluation.  She was seen evaluated within the emergency room and workup included screening for ischemic heart disease.  Patient had troponin level that was mildly elevated with follow-up levels showing further amplification of the troponin levels.  Cardiology was asked see the patient for further recommendations and management.  Patient was seen and evaluated within the emergency room.  During my evaluation she continued to have some mild epigastric pain..        Patient Active Problem List   Diagnosis    Elbow contusion    Elbow pain    Sprain and strain of unspecified site of shoulder and upper arm    Chronic pain syndrome    Failed neck syndrome    Primary insomnia    Fibromyalgia    Depression with anxiety    Cervical radiculopathy    Methicillin resistant Staphylococcus aureus infection    Migraine    Chest pain    Takotsubo cardiomyopathy         Cardiac Testing: I have reviewed the findings below.  EKG:  ECHO:   STRESS TEST:  CATH:  BYPASS:  VASCULAR:    Past Medical History:   has a past medical history of Arthritis, Depression, Elbow injury, Fractures, Headache, Hypertension, and Wrist fracture.    Surgical History:   has a 
    Pharmacy to Manage Heparin Infusion per Hospital Nomogram    Dx: NSTEMI?  Pt wt = _74.8 kg_ (actual weight)  Baseline aPTT = pending     Oral factor Xa-inhibitors may alter and elevate anti-Xa levels used for unfractionated heparin monitoring. As a result, anti-Xa monitoring is not accurate while Xa-inhibitor activity is detectable. Utilize aPTT monitoring when patient received an oral factor Xa-inhibitor (apixaban, betrixaban, edoxaban or rivaroxaban) within 72 hours prior to admission (please document last administration time). The goal is to allow a washout of oral factor Xa-inhibitors by using aPTT for 72 hours, then change to ant-Xa levels for UFH.     Heparin (weight-based) Infusion: CAD/STEMI/NSTEMI/UA/AFib)   Heparin 60 units/kg IVP bolus (max 4,000 units) followed by Heparin infusion at 12 units/kg/hr (recommended max initial rate: 1000 units/hr).  Recheck anti-Xa (unless aPTT being used) in 6 hours.  Goal anti-Xa 0.3-0.7 IU/mL  Goal aPTT =  seconds.    Nate Cifuentes, PharmD    10/16/2024 11:50 AM  
silhouette is without acute process. The  osseous structures are without acute process.  Impression: No acute process.  CT ABDOMEN PELVIS W IV CONTRAST Additional Contrast? None  Narrative: EXAMINATION:  CT OF THE ABDOMEN AND PELVIS WITH CONTRAST 10/16/2024 8:43 am    TECHNIQUE:  CT of the abdomen and pelvis was performed with the administration of  intravenous contrast. Multiplanar reformatted images are provided for review.  Automated exposure control, iterative reconstruction, and/or weight based  adjustment of the mA/kV was utilized to reduce the radiation dose to as low  as reasonably achievable.    COMPARISON:  25 July 2023    HISTORY:  ORDERING SYSTEM PROVIDED HISTORY: epigastric pain  TECHNOLOGIST PROVIDED HISTORY:  Additional Contrast?->None  Reason for exam:->epigastric pain  Decision Support Exception - unselect if not a suspected or confirmed  emergency medical condition->Emergency Medical Condition (MA)  Reason for Exam: n/v/d epigastric pain x 2 hours  Additional signs and symptoms: h/o pancreatitis years ago  Relevant Medical/Surgical History: hysterectomy, gb    FINDINGS:  Lower Chest: Normal bronchovascular markings. No effusion, pneumothorax or  airspace process.    Organs:    Liver: Post cholecystectomy intrahepatic ductal dilatation unchanged.    Gallbladder: Cholecystectomy.  Slight prominence to the common bile duct.  No  obstructing lesion.    Pancreas: Normal    Adrenal glands: Normal    Kidneys: Tiny nonobstructing right renal stone.  No renal stone on the left  and the previously noted large stone may have been removed or passed.  No  stone in the expected course of the ureters.  No hydronephrosis or  hydroureter.    Spleen: Normal    GI/Bowel: There is diffuse thickening of the small bowel mucosa throughout  with fluid distension of the distally and mild increased signal throughout  the small bowel mesentery.  There is thickening of the sigmoid mucosa with  underlying diverticulosis.

## 2024-10-17 NOTE — CARE COORDINATION
Case Management Assessment  Initial Evaluation    Date/Time of Evaluation: 10/17/2024 11:11 AM  Assessment Completed by: Sharda Duron RN    If patient is discharged prior to next notation, then this note serves as note for discharge by case management.    Patient Name: Jessica Cornelius                   YOB: 1970  Diagnosis: Abdominal pain, epigastric [R10.13]  Chest pain [R07.9]  NSTEMI (non-ST elevated myocardial infarction) (HCC) [I21.4]  Nausea and vomiting, unspecified vomiting type [R11.2]  Takotsubo cardiomyopathy [I51.81]                   Date / Time: 10/16/2024  8:00 AM    Patient Admission Status: Inpatient   Readmission Risk (Low < 19, Mod (19-27), High > 27): Readmission Risk Score: 7    Current PCP: Endy Alarcon, DO  PCP verified by CM? Yes    Chart Reviewed: Yes      History Provided by: Patient  Patient Orientation: Alert and Oriented    Patient Cognition: Alert    Hospitalization in the last 30 days (Readmission):  No    If yes, Readmission Assessment in  Navigator will be completed.    Advance Directives:      Code Status: Full Code   Patient's Primary Decision Maker is: Legal Next of Kin      Discharge Planning:    Patient lives with: Spouse/Significant Other, Children Type of Home: House  Primary Care Giver: Self  Patient Support Systems include: Spouse/Significant Other, Children   Current Financial resources: Other (Comment)  Current community resources: None  Current services prior to admission: None            Current DME:              Type of Home Care services:  None    ADLS  Prior functional level: Independent in ADLs/IADLs  Current functional level: Independent in ADLs/IADLs    PT AM-PAC:   /24  OT AM-PAC:   /24    Family can provide assistance at DC: Yes  Would you like Case Management to discuss the discharge plan with any other family members/significant others, and if so, who? No  Plans to Return to Present Housing: Yes  Other Identified Issues/Barriers to

## 2024-10-18 PROBLEM — R79.89 ELEVATED TROPONIN: Status: ACTIVE | Noted: 2024-10-18

## 2024-10-18 LAB
ANION GAP SERPL CALCULATED.3IONS-SCNC: 9 MMOL/L (ref 3–16)
BUN SERPL-MCNC: 24 MG/DL (ref 7–20)
CALCIUM SERPL-MCNC: 8.4 MG/DL (ref 8.3–10.6)
CHLORIDE SERPL-SCNC: 99 MMOL/L (ref 99–110)
CO2 SERPL-SCNC: 26 MMOL/L (ref 21–32)
CREAT SERPL-MCNC: 0.6 MG/DL (ref 0.6–1.1)
DEPRECATED RDW RBC AUTO: 13.5 % (ref 12.4–15.4)
GFR SERPLBLD CREATININE-BSD FMLA CKD-EPI: >90 ML/MIN/{1.73_M2}
GLUCOSE BLD-MCNC: 126 MG/DL (ref 70–99)
GLUCOSE BLD-MCNC: 134 MG/DL (ref 70–99)
GLUCOSE BLD-MCNC: 138 MG/DL (ref 70–99)
GLUCOSE BLD-MCNC: 156 MG/DL (ref 70–99)
GLUCOSE SERPL-MCNC: 134 MG/DL (ref 70–99)
HCT VFR BLD AUTO: 38.2 % (ref 36–48)
HGB BLD-MCNC: 12.4 G/DL (ref 12–16)
HGB BLD-MCNC: 12.7 G/DL (ref 12–16)
HGB BLD-MCNC: 13 G/DL (ref 12–16)
HGB BLD-MCNC: 13.7 G/DL (ref 12–16)
LACTATE BLDV-SCNC: 1.2 MMOL/L (ref 0.4–2)
MCH RBC QN AUTO: 32.3 PG (ref 26–34)
MCHC RBC AUTO-ENTMCNC: 34 G/DL (ref 31–36)
MCV RBC AUTO: 95.1 FL (ref 80–100)
PERFORMED ON: ABNORMAL
PLATELET # BLD AUTO: 231 K/UL (ref 135–450)
PMV BLD AUTO: 9.2 FL (ref 5–10.5)
POTASSIUM SERPL-SCNC: 3.9 MMOL/L (ref 3.5–5.1)
RBC # BLD AUTO: 4.01 M/UL (ref 4–5.2)
SODIUM SERPL-SCNC: 134 MMOL/L (ref 136–145)
WBC # BLD AUTO: 17.3 K/UL (ref 4–11)

## 2024-10-18 PROCEDURE — 36415 COLL VENOUS BLD VENIPUNCTURE: CPT

## 2024-10-18 PROCEDURE — 80048 BASIC METABOLIC PNL TOTAL CA: CPT

## 2024-10-18 PROCEDURE — 2580000003 HC RX 258: Performed by: INTERNAL MEDICINE

## 2024-10-18 PROCEDURE — 6370000000 HC RX 637 (ALT 250 FOR IP): Performed by: NURSE PRACTITIONER

## 2024-10-18 PROCEDURE — 6370000000 HC RX 637 (ALT 250 FOR IP): Performed by: STUDENT IN AN ORGANIZED HEALTH CARE EDUCATION/TRAINING PROGRAM

## 2024-10-18 PROCEDURE — 6360000002 HC RX W HCPCS: Performed by: INTERNAL MEDICINE

## 2024-10-18 PROCEDURE — 85027 COMPLETE CBC AUTOMATED: CPT

## 2024-10-18 PROCEDURE — 6370000000 HC RX 637 (ALT 250 FOR IP)

## 2024-10-18 PROCEDURE — 85018 HEMOGLOBIN: CPT

## 2024-10-18 PROCEDURE — 83605 ASSAY OF LACTIC ACID: CPT

## 2024-10-18 PROCEDURE — 99232 SBSQ HOSP IP/OBS MODERATE 35: CPT | Performed by: NURSE PRACTITIONER

## 2024-10-18 PROCEDURE — 1200000000 HC SEMI PRIVATE

## 2024-10-18 PROCEDURE — 6370000000 HC RX 637 (ALT 250 FOR IP): Performed by: INTERNAL MEDICINE

## 2024-10-18 RX ORDER — OXYCODONE AND ACETAMINOPHEN 5; 325 MG/1; MG/1
1 TABLET ORAL EVERY 6 HOURS PRN
Status: COMPLETED | OUTPATIENT
Start: 2024-10-18 | End: 2024-10-18

## 2024-10-18 RX ORDER — OXYCODONE AND ACETAMINOPHEN 5; 325 MG/1; MG/1
1 TABLET ORAL EVERY 4 HOURS PRN
Status: COMPLETED | OUTPATIENT
Start: 2024-10-18 | End: 2024-10-18

## 2024-10-18 RX ORDER — METOPROLOL SUCCINATE 25 MG/1
25 TABLET, EXTENDED RELEASE ORAL 2 TIMES DAILY
Status: DISCONTINUED | OUTPATIENT
Start: 2024-10-18 | End: 2024-10-20 | Stop reason: HOSPADM

## 2024-10-18 RX ORDER — OXYCODONE AND ACETAMINOPHEN 5; 325 MG/1; MG/1
1 TABLET ORAL EVERY 4 HOURS PRN
Status: DISPENSED | OUTPATIENT
Start: 2024-10-18 | End: 2024-10-20

## 2024-10-18 RX ADMIN — METOPROLOL SUCCINATE 25 MG: 25 TABLET, FILM COATED, EXTENDED RELEASE ORAL at 08:56

## 2024-10-18 RX ADMIN — PANTOPRAZOLE SODIUM 40 MG: 40 INJECTION, POWDER, FOR SOLUTION INTRAVENOUS at 08:56

## 2024-10-18 RX ADMIN — OXYCODONE AND ACETAMINOPHEN 1 TABLET: 5; 325 TABLET ORAL at 14:07

## 2024-10-18 RX ADMIN — OXYCODONE AND ACETAMINOPHEN 1 TABLET: 5; 325 TABLET ORAL at 18:23

## 2024-10-18 RX ADMIN — OXYCODONE AND ACETAMINOPHEN 1 TABLET: 5; 325 TABLET ORAL at 03:55

## 2024-10-18 RX ADMIN — GABAPENTIN 800 MG: 400 CAPSULE ORAL at 14:07

## 2024-10-18 RX ADMIN — SODIUM CHLORIDE, PRESERVATIVE FREE 10 ML: 5 INJECTION INTRAVENOUS at 09:04

## 2024-10-18 RX ADMIN — SODIUM CHLORIDE, PRESERVATIVE FREE 10 ML: 5 INJECTION INTRAVENOUS at 20:40

## 2024-10-18 RX ADMIN — GABAPENTIN 800 MG: 400 CAPSULE ORAL at 20:45

## 2024-10-18 RX ADMIN — OXYCODONE AND ACETAMINOPHEN 1 TABLET: 5; 325 TABLET ORAL at 09:48

## 2024-10-18 RX ADMIN — PIPERACILLIN AND TAZOBACTAM 3375 MG: 3; .375 INJECTION, POWDER, FOR SOLUTION INTRAVENOUS at 12:00

## 2024-10-18 RX ADMIN — PIPERACILLIN AND TAZOBACTAM 3375 MG: 3; .375 INJECTION, POWDER, FOR SOLUTION INTRAVENOUS at 03:34

## 2024-10-18 RX ADMIN — GABAPENTIN 800 MG: 400 CAPSULE ORAL at 08:56

## 2024-10-18 RX ADMIN — OXYCODONE AND ACETAMINOPHEN 1 TABLET: 5; 325 TABLET ORAL at 23:46

## 2024-10-18 RX ADMIN — FLUOXETINE HYDROCHLORIDE 40 MG: 20 CAPSULE ORAL at 08:56

## 2024-10-18 RX ADMIN — METOPROLOL SUCCINATE 25 MG: 25 TABLET, EXTENDED RELEASE ORAL at 20:45

## 2024-10-18 RX ADMIN — PIPERACILLIN AND TAZOBACTAM 3375 MG: 3; .375 INJECTION, POWDER, FOR SOLUTION INTRAVENOUS at 19:49

## 2024-10-18 RX ADMIN — PANTOPRAZOLE SODIUM 40 MG: 40 INJECTION, POWDER, FOR SOLUTION INTRAVENOUS at 20:42

## 2024-10-18 ASSESSMENT — PAIN SCALES - GENERAL
PAINLEVEL_OUTOF10: 7
PAINLEVEL_OUTOF10: 2
PAINLEVEL_OUTOF10: 5
PAINLEVEL_OUTOF10: 7

## 2024-10-18 ASSESSMENT — PAIN DESCRIPTION - ORIENTATION
ORIENTATION: MID

## 2024-10-18 ASSESSMENT — PAIN DESCRIPTION - DESCRIPTORS
DESCRIPTORS: SHARP
DESCRIPTORS: ACHING
DESCRIPTORS: SHARP
DESCRIPTORS: SHARP

## 2024-10-18 ASSESSMENT — PAIN DESCRIPTION - LOCATION
LOCATION: ABDOMEN
LOCATION: ABDOMEN;CHEST
LOCATION: ABDOMEN

## 2024-10-18 ASSESSMENT — PAIN - FUNCTIONAL ASSESSMENT
PAIN_FUNCTIONAL_ASSESSMENT: ACTIVITIES ARE NOT PREVENTED
PAIN_FUNCTIONAL_ASSESSMENT: ACTIVITIES ARE NOT PREVENTED

## 2024-10-18 NOTE — PLAN OF CARE
Problem: Discharge Planning  Goal: Discharge to home or other facility with appropriate resources  10/18/2024 1050 by Linda Calderon, RN  Outcome: Progressing     Problem: Safety - Adult  Goal: Free from fall injury  10/18/2024 1050 by Linda Calderon, RN  Outcome: Progressing     Problem: ABCDS Injury Assessment  Goal: Absence of physical injury  10/18/2024 1050 by Linda Calderon, RN  Outcome: Progressing

## 2024-10-18 NOTE — PLAN OF CARE
Problem: Discharge Planning  Goal: Discharge to home or other facility with appropriate resources  Outcome: Progressing     Problem: Safety - Adult  Goal: Free from fall injury  Outcome: Progressing     Problem: ABCDS Injury Assessment  Goal: Absence of physical injury  10/17/2024 2245 by Meme Hawkins, RN  Outcome: Progressing  10/17/2024 0925 by Patricia Olmos, RN  Outcome: Progressing

## 2024-10-19 LAB
ALBUMIN SERPL-MCNC: 3.2 G/DL (ref 3.4–5)
ALP SERPL-CCNC: 54 U/L (ref 40–129)
ALT SERPL-CCNC: 22 U/L (ref 10–40)
ANION GAP SERPL CALCULATED.3IONS-SCNC: 9 MMOL/L (ref 3–16)
AST SERPL-CCNC: 14 U/L (ref 15–37)
BILIRUB DIRECT SERPL-MCNC: 0.2 MG/DL (ref 0–0.3)
BILIRUB INDIRECT SERPL-MCNC: 0.2 MG/DL (ref 0–1)
BILIRUB SERPL-MCNC: 0.4 MG/DL (ref 0–1)
BUN SERPL-MCNC: 14 MG/DL (ref 7–20)
C DIFF TOX A+B STL QL IA: NORMAL
CALCIUM SERPL-MCNC: 8.1 MG/DL (ref 8.3–10.6)
CHLORIDE SERPL-SCNC: 97 MMOL/L (ref 99–110)
CO2 SERPL-SCNC: 27 MMOL/L (ref 21–32)
CREAT SERPL-MCNC: 0.6 MG/DL (ref 0.6–1.1)
DEPRECATED RDW RBC AUTO: 13.4 % (ref 12.4–15.4)
FERRITIN SERPL IA-MCNC: 240 NG/ML (ref 15–150)
GFR SERPLBLD CREATININE-BSD FMLA CKD-EPI: >90 ML/MIN/{1.73_M2}
GLUCOSE BLD-MCNC: 112 MG/DL (ref 70–99)
GLUCOSE BLD-MCNC: 117 MG/DL (ref 70–99)
GLUCOSE BLD-MCNC: 118 MG/DL (ref 70–99)
GLUCOSE BLD-MCNC: 121 MG/DL (ref 70–99)
GLUCOSE SERPL-MCNC: 118 MG/DL (ref 70–99)
HCT VFR BLD AUTO: 38 % (ref 36–48)
HGB BLD-MCNC: 12.4 G/DL (ref 12–16)
HGB BLD-MCNC: 12.7 G/DL (ref 12–16)
HGB BLD-MCNC: 13.2 G/DL (ref 12–16)
IRON SATN MFR SERPL: 11 % (ref 15–50)
IRON SERPL-MCNC: 19 UG/DL (ref 37–145)
MAGNESIUM SERPL-MCNC: 2.4 MG/DL (ref 1.8–2.4)
MCH RBC QN AUTO: 32.1 PG (ref 26–34)
MCHC RBC AUTO-ENTMCNC: 33.4 G/DL (ref 31–36)
MCV RBC AUTO: 96.1 FL (ref 80–100)
PERFORMED ON: ABNORMAL
PLATELET # BLD AUTO: 222 K/UL (ref 135–450)
PMV BLD AUTO: 9.6 FL (ref 5–10.5)
POTASSIUM SERPL-SCNC: 3.7 MMOL/L (ref 3.5–5.1)
POTASSIUM SERPL-SCNC: 3.7 MMOL/L (ref 3.5–5.1)
PROT SERPL-MCNC: 5.9 G/DL (ref 6.4–8.2)
RBC # BLD AUTO: 3.96 M/UL (ref 4–5.2)
SODIUM SERPL-SCNC: 133 MMOL/L (ref 136–145)
TIBC SERPL-MCNC: 174 UG/DL (ref 260–445)
TSH SERPL DL<=0.005 MIU/L-ACNC: 0.64 UIU/ML (ref 0.27–4.2)
WBC # BLD AUTO: 11.9 K/UL (ref 4–11)

## 2024-10-19 PROCEDURE — 83735 ASSAY OF MAGNESIUM: CPT

## 2024-10-19 PROCEDURE — 36415 COLL VENOUS BLD VENIPUNCTURE: CPT

## 2024-10-19 PROCEDURE — 84443 ASSAY THYROID STIM HORMONE: CPT

## 2024-10-19 PROCEDURE — 6370000000 HC RX 637 (ALT 250 FOR IP): Performed by: NURSE PRACTITIONER

## 2024-10-19 PROCEDURE — 83550 IRON BINDING TEST: CPT

## 2024-10-19 PROCEDURE — 2580000003 HC RX 258: Performed by: INTERNAL MEDICINE

## 2024-10-19 PROCEDURE — 1200000000 HC SEMI PRIVATE

## 2024-10-19 PROCEDURE — 82728 ASSAY OF FERRITIN: CPT

## 2024-10-19 PROCEDURE — 83540 ASSAY OF IRON: CPT

## 2024-10-19 PROCEDURE — 80076 HEPATIC FUNCTION PANEL: CPT

## 2024-10-19 PROCEDURE — 80048 BASIC METABOLIC PNL TOTAL CA: CPT

## 2024-10-19 PROCEDURE — 6360000002 HC RX W HCPCS: Performed by: INTERNAL MEDICINE

## 2024-10-19 PROCEDURE — 85018 HEMOGLOBIN: CPT

## 2024-10-19 PROCEDURE — 6370000000 HC RX 637 (ALT 250 FOR IP)

## 2024-10-19 PROCEDURE — 85027 COMPLETE CBC AUTOMATED: CPT

## 2024-10-19 RX ADMIN — PIPERACILLIN AND TAZOBACTAM 3375 MG: 3; .375 INJECTION, POWDER, FOR SOLUTION INTRAVENOUS at 03:43

## 2024-10-19 RX ADMIN — OXYCODONE AND ACETAMINOPHEN 1 TABLET: 5; 325 TABLET ORAL at 12:20

## 2024-10-19 RX ADMIN — SODIUM CHLORIDE, PRESERVATIVE FREE 10 ML: 5 INJECTION INTRAVENOUS at 21:16

## 2024-10-19 RX ADMIN — OXYCODONE AND ACETAMINOPHEN 1 TABLET: 5; 325 TABLET ORAL at 21:35

## 2024-10-19 RX ADMIN — PANTOPRAZOLE SODIUM 40 MG: 40 INJECTION, POWDER, FOR SOLUTION INTRAVENOUS at 21:21

## 2024-10-19 RX ADMIN — OXYCODONE AND ACETAMINOPHEN 1 TABLET: 5; 325 TABLET ORAL at 16:53

## 2024-10-19 RX ADMIN — METOPROLOL SUCCINATE 25 MG: 25 TABLET, EXTENDED RELEASE ORAL at 08:11

## 2024-10-19 RX ADMIN — FLUOXETINE HYDROCHLORIDE 40 MG: 20 CAPSULE ORAL at 08:11

## 2024-10-19 RX ADMIN — METOPROLOL SUCCINATE 25 MG: 25 TABLET, EXTENDED RELEASE ORAL at 21:20

## 2024-10-19 RX ADMIN — PIPERACILLIN AND TAZOBACTAM 3375 MG: 3; .375 INJECTION, POWDER, FOR SOLUTION INTRAVENOUS at 12:16

## 2024-10-19 RX ADMIN — GABAPENTIN 800 MG: 400 CAPSULE ORAL at 15:07

## 2024-10-19 RX ADMIN — GABAPENTIN 800 MG: 400 CAPSULE ORAL at 21:19

## 2024-10-19 RX ADMIN — GABAPENTIN 800 MG: 400 CAPSULE ORAL at 08:11

## 2024-10-19 RX ADMIN — OXYCODONE AND ACETAMINOPHEN 1 TABLET: 5; 325 TABLET ORAL at 08:11

## 2024-10-19 RX ADMIN — PANTOPRAZOLE SODIUM 40 MG: 40 INJECTION, POWDER, FOR SOLUTION INTRAVENOUS at 08:11

## 2024-10-19 RX ADMIN — SODIUM CHLORIDE, PRESERVATIVE FREE 10 ML: 5 INJECTION INTRAVENOUS at 08:11

## 2024-10-19 RX ADMIN — PIPERACILLIN AND TAZOBACTAM 3375 MG: 3; .375 INJECTION, POWDER, FOR SOLUTION INTRAVENOUS at 18:58

## 2024-10-19 RX ADMIN — PROCHLORPERAZINE EDISYLATE 10 MG: 5 INJECTION INTRAMUSCULAR; INTRAVENOUS at 08:19

## 2024-10-19 RX ADMIN — OXYCODONE AND ACETAMINOPHEN 1 TABLET: 5; 325 TABLET ORAL at 04:12

## 2024-10-19 ASSESSMENT — PAIN DESCRIPTION - ORIENTATION
ORIENTATION: MID;UPPER
ORIENTATION: UPPER
ORIENTATION: MID;UPPER

## 2024-10-19 ASSESSMENT — PAIN DESCRIPTION - LOCATION
LOCATION: ABDOMEN

## 2024-10-19 ASSESSMENT — PAIN SCALES - GENERAL
PAINLEVEL_OUTOF10: 7
PAINLEVEL_OUTOF10: 4
PAINLEVEL_OUTOF10: 6
PAINLEVEL_OUTOF10: 7
PAINLEVEL_OUTOF10: 6

## 2024-10-19 ASSESSMENT — PAIN DESCRIPTION - DESCRIPTORS
DESCRIPTORS: SHARP
DESCRIPTORS: STABBING
DESCRIPTORS: SHARP

## 2024-10-19 ASSESSMENT — PAIN - FUNCTIONAL ASSESSMENT: PAIN_FUNCTIONAL_ASSESSMENT: ACTIVITIES ARE NOT PREVENTED

## 2024-10-19 NOTE — PLAN OF CARE
Problem: Discharge Planning  Goal: Discharge to home or other facility with appropriate resources  10/19/2024 0904 by Angela Kim RN  Outcome: Progressing  10/19/2024 0004 by Trudy Ortiz RN  Outcome: Progressing  Flowsheets (Taken 10/19/2024 0004)  Discharge to home or other facility with appropriate resources:   Identify barriers to discharge with patient and caregiver   Arrange for needed discharge resources and transportation as appropriate     Problem: Safety - Adult  Goal: Free from fall injury  10/19/2024 0904 by Angela Kim RN  Outcome: Progressing  10/19/2024 0004 by Trudy Ortiz RN  Outcome: Progressing  Flowsheets (Taken 10/19/2024 0004)  Free From Fall Injury:   Instruct family/caregiver on patient safety   Based on caregiver fall risk screen, instruct family/caregiver to ask for assistance with transferring infant if caregiver noted to have fall risk factors     Problem: ABCDS Injury Assessment  Goal: Absence of physical injury  10/19/2024 0904 by Angela Kim RN  Outcome: Progressing  10/19/2024 0004 by Trudy Ortiz RN  Outcome: Progressing  Flowsheets (Taken 10/19/2024 0004)  Absence of Physical Injury: Implement safety measures based on patient assessment     Problem: Pain  Goal: Verbalizes/displays adequate comfort level or baseline comfort level  10/19/2024 0004 by Trudy Ortiz RN  Outcome: Progressing  Flowsheets (Taken 10/19/2024 0004)  Verbalizes/displays adequate comfort level or baseline comfort level:   Encourage patient to monitor pain and request assistance   Assess pain using appropriate pain scale   Administer analgesics based on type and severity of pain and evaluate response

## 2024-10-19 NOTE — PLAN OF CARE
Problem: Discharge Planning  Goal: Discharge to home or other facility with appropriate resources  10/19/2024 0004 by Trudy Ortiz RN  Outcome: Progressing  Flowsheets (Taken 10/19/2024 0004)  Discharge to home or other facility with appropriate resources:   Identify barriers to discharge with patient and caregiver   Arrange for needed discharge resources and transportation as appropriate     Problem: Safety - Adult  Goal: Free from fall injury  10/19/2024 0004 by Trudy Ortiz RN  Outcome: Progressing  Flowsheets (Taken 10/19/2024 0004)  Free From Fall Injury:   Instruct family/caregiver on patient safety   Based on caregiver fall risk screen, instruct family/caregiver to ask for assistance with transferring infant if caregiver noted to have fall risk factors     Problem: ABCDS Injury Assessment  Goal: Absence of physical injury  10/19/2024 0004 by Trudy Ortiz RN  Outcome: Progressing  Flowsheets (Taken 10/19/2024 0004)  Absence of Physical Injury: Implement safety measures based on patient assessment     Problem: Pain  Goal: Verbalizes/displays adequate comfort level or baseline comfort level  Outcome: Progressing  Flowsheets (Taken 10/19/2024 0004)  Verbalizes/displays adequate comfort level or baseline comfort level:   Encourage patient to monitor pain and request assistance   Assess pain using appropriate pain scale   Administer analgesics based on type and severity of pain and evaluate response

## 2024-10-20 VITALS
TEMPERATURE: 98.6 F | DIASTOLIC BLOOD PRESSURE: 74 MMHG | HEART RATE: 78 BPM | OXYGEN SATURATION: 98 % | HEIGHT: 68 IN | WEIGHT: 164.5 LBS | SYSTOLIC BLOOD PRESSURE: 109 MMHG | RESPIRATION RATE: 16 BRPM | BODY MASS INDEX: 24.93 KG/M2

## 2024-10-20 LAB
ANION GAP SERPL CALCULATED.3IONS-SCNC: 8 MMOL/L (ref 3–16)
BUN SERPL-MCNC: 10 MG/DL (ref 7–20)
C DIFF TOX GENS STL QL NAA+PROBE: ABNORMAL
CALCIUM SERPL-MCNC: 8.2 MG/DL (ref 8.3–10.6)
CHLORIDE SERPL-SCNC: 99 MMOL/L (ref 99–110)
CO2 SERPL-SCNC: 28 MMOL/L (ref 21–32)
CREAT SERPL-MCNC: 0.7 MG/DL (ref 0.6–1.1)
DEPRECATED RDW RBC AUTO: 13.3 % (ref 12.4–15.4)
GFR SERPLBLD CREATININE-BSD FMLA CKD-EPI: >90 ML/MIN/{1.73_M2}
GI PATHOGENS PNL STL NAA+PROBE: NORMAL
GLUCOSE BLD-MCNC: 111 MG/DL (ref 70–99)
GLUCOSE BLD-MCNC: 126 MG/DL (ref 70–99)
GLUCOSE SERPL-MCNC: 119 MG/DL (ref 70–99)
HCT VFR BLD AUTO: 36.1 % (ref 36–48)
HGB BLD-MCNC: 12.1 G/DL (ref 12–16)
MAGNESIUM SERPL-MCNC: 2.2 MG/DL (ref 1.8–2.4)
MCH RBC QN AUTO: 32.2 PG (ref 26–34)
MCHC RBC AUTO-ENTMCNC: 33.5 G/DL (ref 31–36)
MCV RBC AUTO: 96.1 FL (ref 80–100)
NT-PROBNP SERPL-MCNC: 7283 PG/ML (ref 0–124)
ORGANISM: ABNORMAL
PERFORMED ON: ABNORMAL
PERFORMED ON: ABNORMAL
PLATELET # BLD AUTO: 224 K/UL (ref 135–450)
PMV BLD AUTO: 9.2 FL (ref 5–10.5)
POTASSIUM SERPL-SCNC: 3.5 MMOL/L (ref 3.5–5.1)
POTASSIUM SERPL-SCNC: 3.5 MMOL/L (ref 3.5–5.1)
RBC # BLD AUTO: 3.75 M/UL (ref 4–5.2)
SODIUM SERPL-SCNC: 135 MMOL/L (ref 136–145)
WBC # BLD AUTO: 9.4 K/UL (ref 4–11)

## 2024-10-20 PROCEDURE — 36415 COLL VENOUS BLD VENIPUNCTURE: CPT

## 2024-10-20 PROCEDURE — 80048 BASIC METABOLIC PNL TOTAL CA: CPT

## 2024-10-20 PROCEDURE — 2580000003 HC RX 258: Performed by: INTERNAL MEDICINE

## 2024-10-20 PROCEDURE — 85027 COMPLETE CBC AUTOMATED: CPT

## 2024-10-20 PROCEDURE — 83735 ASSAY OF MAGNESIUM: CPT

## 2024-10-20 PROCEDURE — 6360000002 HC RX W HCPCS: Performed by: INTERNAL MEDICINE

## 2024-10-20 PROCEDURE — 6370000000 HC RX 637 (ALT 250 FOR IP): Performed by: NURSE PRACTITIONER

## 2024-10-20 PROCEDURE — 6370000000 HC RX 637 (ALT 250 FOR IP)

## 2024-10-20 PROCEDURE — 83880 ASSAY OF NATRIURETIC PEPTIDE: CPT

## 2024-10-20 RX ORDER — VANCOMYCIN HYDROCHLORIDE 50 MG/ML
125 KIT ORAL EVERY 6 HOURS SCHEDULED
Status: DISCONTINUED | OUTPATIENT
Start: 2024-10-20 | End: 2024-10-20 | Stop reason: HOSPADM

## 2024-10-20 RX ORDER — OXYCODONE AND ACETAMINOPHEN 5; 325 MG/1; MG/1
1 TABLET ORAL EVERY 4 HOURS PRN
Status: DISCONTINUED | OUTPATIENT
Start: 2024-10-20 | End: 2024-10-20 | Stop reason: HOSPADM

## 2024-10-20 RX ORDER — OXYCODONE AND ACETAMINOPHEN 5; 325 MG/1; MG/1
1 TABLET ORAL EVERY 6 HOURS PRN
Qty: 12 TABLET | Refills: 0 | Status: SHIPPED | OUTPATIENT
Start: 2024-10-20 | End: 2024-10-23

## 2024-10-20 RX ORDER — METOPROLOL SUCCINATE 25 MG/1
25 TABLET, EXTENDED RELEASE ORAL 2 TIMES DAILY
Qty: 30 TABLET | Refills: 3 | Status: SHIPPED | OUTPATIENT
Start: 2024-10-20

## 2024-10-20 RX ORDER — VANCOMYCIN HYDROCHLORIDE 50 MG/ML
125 KIT ORAL 4 TIMES DAILY
Qty: 90 ML | Refills: 0 | Status: SHIPPED | OUTPATIENT
Start: 2024-10-20 | End: 2024-10-29

## 2024-10-20 RX ADMIN — GABAPENTIN 800 MG: 400 CAPSULE ORAL at 15:01

## 2024-10-20 RX ADMIN — OXYCODONE HYDROCHLORIDE AND ACETAMINOPHEN 1 TABLET: 5; 325 TABLET ORAL at 15:01

## 2024-10-20 RX ADMIN — VANCOMYCIN HYDROCHLORIDE 125 MG: 250 POWDER, FOR SOLUTION ORAL at 06:15

## 2024-10-20 RX ADMIN — OXYCODONE AND ACETAMINOPHEN 1 TABLET: 5; 325 TABLET ORAL at 10:43

## 2024-10-20 RX ADMIN — FLUOXETINE HYDROCHLORIDE 40 MG: 20 CAPSULE ORAL at 09:13

## 2024-10-20 RX ADMIN — PANTOPRAZOLE SODIUM 40 MG: 40 INJECTION, POWDER, FOR SOLUTION INTRAVENOUS at 09:13

## 2024-10-20 RX ADMIN — GABAPENTIN 800 MG: 400 CAPSULE ORAL at 09:13

## 2024-10-20 RX ADMIN — VANCOMYCIN HYDROCHLORIDE 125 MG: 250 POWDER, FOR SOLUTION ORAL at 10:46

## 2024-10-20 RX ADMIN — OXYCODONE AND ACETAMINOPHEN 1 TABLET: 5; 325 TABLET ORAL at 01:44

## 2024-10-20 RX ADMIN — OXYCODONE AND ACETAMINOPHEN 1 TABLET: 5; 325 TABLET ORAL at 06:18

## 2024-10-20 RX ADMIN — SODIUM CHLORIDE, PRESERVATIVE FREE 10 ML: 5 INJECTION INTRAVENOUS at 09:14

## 2024-10-20 RX ADMIN — METOPROLOL SUCCINATE 25 MG: 25 TABLET, EXTENDED RELEASE ORAL at 09:13

## 2024-10-20 RX ADMIN — PIPERACILLIN AND TAZOBACTAM 3375 MG: 3; .375 INJECTION, POWDER, FOR SOLUTION INTRAVENOUS at 03:35

## 2024-10-20 ASSESSMENT — PAIN DESCRIPTION - ORIENTATION
ORIENTATION: UPPER;MID
ORIENTATION: MID;UPPER
ORIENTATION: MID
ORIENTATION: MID

## 2024-10-20 ASSESSMENT — PAIN SCALES - GENERAL
PAINLEVEL_OUTOF10: 4
PAINLEVEL_OUTOF10: 6
PAINLEVEL_OUTOF10: 7

## 2024-10-20 ASSESSMENT — PAIN DESCRIPTION - DESCRIPTORS
DESCRIPTORS: DISCOMFORT
DESCRIPTORS: DISCOMFORT
DESCRIPTORS: STABBING
DESCRIPTORS: STABBING

## 2024-10-20 ASSESSMENT — PAIN DESCRIPTION - LOCATION
LOCATION: ABDOMEN

## 2024-10-20 NOTE — DISCHARGE SUMMARY
V2.0  Discharge Summary    Name:  Jessica Cornelius /Age/Sex: 1970 (54 y.o. female)   Admit Date: 10/16/2024  Discharge Date: 10/20/24    MRN & CSN:  [id number] & [id number] Encounter Date and Time 10/20/24 1:57 PM EDT    Attending:  Pasquale Alba MD Discharging Provider: John DUKES DO       Hospital Course:     Brief HPI: Jessica Cornelius is a 54 y.o. female who presented to [facility] with chest pain and epigastric pain.  Patient's symptoms significant for epigastric and chest pain.  Status post cardiac cath.  No obstructive disease.  Patient with possible Takotsubo cardiomyopathy.  Patient states she has been under a lot of stress at home.  Patient denies any nausea or vomiting.  No diarrhea.     Brief Problem Based Course:     Elevated Troponin secondary to Takotsubo cardiomyopathy  -Elevated troponins in ED  -Cardiology consulted  -Cath performed  -Takotsubo cardiomyopathy with EF of about 30%  -Metoprolol 25 mg increased to twice daily  -Hold off on ACE/ARB, MRA, and SGLT2  -Will follow up outpatient  -Placed on heparin in ED, DC'd due to bleeding     Rectal bleeding  -Angiogram showed normal coronaries  -Heparin drip Dc'd  -Serial hemoglobin  -GI consulted  -IV Protonix twice daily     Abdominal pain  -Likely secondary to C.Diff Colitis  -Lactic acid elevated  -CT abdomen with findings consistent with enteritis, colitis, or combination  -Consider colonoscopy outpatient  -Continue oral vancomycin for 9 more days outpatient  -Regular diet, advance as tolerated     Lactic acidosis  -Elevated lactate in the setting of NSTEMI  -Appears resolved on 10/18    The patient expressed appropriate understanding of, and agreement with the discharge recommendations, medications, and plan.     Consults this admission:  IP CONSULT TO CARDIOLOGY  IP CONSULT TO HOSPITALIST  IP CONSULT TO GI  IP CONSULT TO HEART FAILURE NURSE/COORDINATOR    Discharge Diagnosis:   Takotsubo cardiomyopathy  C.Diff

## 2024-10-20 NOTE — PLAN OF CARE
Problem: Discharge Planning  Goal: Discharge to home or other facility with appropriate resources  Outcome: Progressing     Problem: Safety - Adult  Goal: Free from fall injury  10/20/2024 1151 by Heather Hall RN  Outcome: Progressing    Problem: ABCDS Injury Assessment  Goal: Absence of physical injury  10/20/2024 1151 by Heather Hall RN  Outcome: Progressing

## 2024-10-20 NOTE — PLAN OF CARE
Problem: Safety - Adult  Goal: Free from fall injury  10/19/2024 2251 by Trudy Ortiz, RN  Outcome: Progressing      Problem: ABCDS Injury Assessment  Goal: Absence of physical injury  10/19/2024 2251 by Trudy Ortiz, RN  Outcome: Progressing     Problem: Pain  Goal: Verbalizes/displays adequate comfort level or baseline comfort level  Outcome: Progressing

## 2024-10-20 NOTE — DISCHARGE INSTRUCTIONS
Please take the oral vancomycin for 9 more days  Return to hospital if symptoms persist/worsen despite treatment

## 2024-10-21 ENCOUNTER — TELEPHONE (OUTPATIENT)
Dept: FAMILY MEDICINE CLINIC | Age: 54
End: 2024-10-21

## 2024-10-21 NOTE — TELEPHONE ENCOUNTER
Care Transitions Initial Follow Up Call    Outreach made within 2 business days of discharge: Yes    Patient: Jessica Cornelius Patient : 1970   MRN: [id number]  Reason for Admission: NSTEMI  Discharge Date: 10/20/24       Spoke with: Jessica Cornelius     Discharge department/facility: [facility] Interactive Patient Contact:  Was patient able to fill all prescriptions: Yes  Was patient instructed to bring all medications to the follow-up visit: Yes  Is patient taking all medications as directed in the discharge summary? Yes  Does patient understand their discharge instructions: Yes  Does patient have questions or concerns that need addressed prior to 7-14 day follow up office visit: no    Additional needs identified to be addressed with provider  No needs identified             Scheduled appointment with PCP within 7-14 days    Follow Up  Future Appointments   Date Time Provider Department Center   2024  9:00 AM Reina De La O, APRN - [facility] [place]

## 2024-10-22 RX ORDER — SUCRALFATE ORAL 1 G/10ML
1 SUSPENSION ORAL 4 TIMES DAILY
Qty: 560 ML | Refills: 0 | Status: SHIPPED | OUTPATIENT
Start: 2024-10-22 | End: 2024-11-05

## 2024-10-23 NOTE — PROGRESS NOTES
Progress Note    Patient Jessica Cornelius  MRN: [id number]  YOB: 1970 Age: 54 y.o. Sex: female  Room: [address] [place] [address]       Admitting Physician: Pasquale Alba MD   Date of Admission: 10/16/2024  8:00 AM   Primary Care Physician: Endy Alarcon DO     Subjective:  Jessica Cornelius was seen and examined. We are following for enterocolitis on CT.  -- No further bowel movements or rectal bleeding today  -- She has persistent abdominal pain  -- Tolerating minimal PO  -- She is not tolerating PO    ROS:  Constitutional: Denies fever, no change in appetite  Respiratory: Denies cough or shortness of breath  Cardiovascular: Denies chest pain or edema    Objective:  Vital Signs:   Vitals:    10/19/24 1229   BP: 107/74   Pulse: 76   Resp: 16   Temp: 97.7 °F (36.5 °C)   SpO2: 100%         Physical Exam:  Constitutional: Alert and oriented x 4. No acute distress.   Respiratory: Respirations nonlabored, no crepitus  GI: Abdomen nondistended, soft, and epigastric TTP without rebound or guarding.   Neurological: No focal deficits noted. No asterixis.    Intake/Output:    Intake/Output Summary (Last 24 hours) at 10/19/2024 1313  Last data filed at 10/19/2024 0658  Gross per 24 hour   Intake 856.96 ml   Output --   Net 856.96 ml        Current Medications:  Current Facility-Administered Medications   Medication Dose Route Frequency Provider Last Rate Last Admin    metoprolol succinate (TOPROL XL) extended release tablet 25 mg  25 mg Oral BID Reina De La O APRN - CNP   25 mg at 10/19/24 0811    oxyCODONE-acetaminophen (PERCOCET) 5-325 MG per tablet 1 tablet  1 tablet Oral Q4H PRN Klaus Gonzalez DO   1 tablet at 10/19/24 1220    acetaminophen (TYLENOL) tablet 650 mg  650 mg Oral Q4H PRN Sharri Irving MD   650 mg at 10/17/24 0604    gabapentin (NEURONTIN) capsule 800 mg  800 mg Oral TID Klaus Gonzalez DO   800 mg at 10/19/24 0811    FLUoxetine (PROZAC) capsule 40 mg  40 mg Oral Daily Carlos 
    Progress Note    Patient Jessica Cornelius  MRN: [id number]  YOB: 1970 Age: 54 y.o. Sex: female  Room: [address] [place] [address]       Admitting Physician: Pasquale Alba MD   Date of Admission: 10/16/2024  8:00 AM   Primary Care Physician: Endy Alarcon DO     Subjective:  Jessica Cornelius was seen and examined. We are following for enterocolitis on CT.  -- Started on vancomycin this morning for positive C. Diff  -- She wants to eat so she can get home to her autistic adult son    ROS:  Constitutional: Denies fever, no change in appetite  Respiratory: Denies cough or shortness of breath  Cardiovascular: Denies chest pain or edema    Objective:  Vital Signs:   Vitals:    10/20/24 0909   BP: 109/74   Pulse: 78   Resp: 16   Temp: 98.6 °F (37 °C)   SpO2: 98%         Physical Exam:  Constitutional: Alert and oriented x 4. No acute distress.   Respiratory: Respirations nonlabored, no crepitus  GI: Abdomen nondistended, soft, and epigastric TTP without rebound or guarding.   Neurological: No focal deficits noted. No asterixis.    Intake/Output:    Intake/Output Summary (Last 24 hours) at 10/20/2024 1011  Last data filed at 10/20/2024 0621  Gross per 24 hour   Intake 1070.81 ml   Output --   Net 1070.81 ml        Current Medications:  Current Facility-Administered Medications   Medication Dose Route Frequency Provider Last Rate Last Admin    vancomycin (FIRVANQ) 50 MG/ML oral solution 125 mg  125 mg Oral 4 times per day Juli Santos APRN - CNP   125 mg at 10/20/24 0615    metoprolol succinate (TOPROL XL) extended release tablet 25 mg  25 mg Oral BID Reina De La O APRN - CNP   25 mg at 10/20/24 0913    oxyCODONE-acetaminophen (PERCOCET) 5-325 MG per tablet 1 tablet  1 tablet Oral Q4H PRN Klaus Gonzalez DO   1 tablet at 10/20/24 0618    acetaminophen (TYLENOL) tablet 650 mg  650 mg Oral Q4H PRN Sharri Irving MD   650 mg at 10/17/24 0604    gabapentin (NEURONTIN) capsule 800 mg  800 mg Oral TID 
    Progress Note    Patient Jessica Cornelius  MRN: [id number]  YOB: 1970 Age: 54 y.o. Sex: female  Room: [address] [place] [address]       Admitting Physician: Pasquale Alba MD   Date of Admission: 10/16/2024  8:00 AM   Primary Care Physician: Endy Alarcon DO     Subjective:  Jessica Cornelius was seen and examined. We are following for enterocolitis on CT.  -- No further bowel movements or rectal bleeding today  -- She has persistent abdominal pain  -- Tolerating minimal PO    ROS:  Constitutional: Denies fever, no change in appetite  Respiratory: Denies cough or shortness of breath  Cardiovascular: Denies chest pain or edema    Objective:  Vital Signs:   Vitals:    10/18/24 0821   BP: 106/72   Pulse: 99   Resp: 17   Temp:    SpO2: 98%         Physical Exam:  Constitutional: Alert and oriented x 4. No acute distress.   Respiratory: Respirations nonlabored, no crepitus  GI: Abdomen nondistended, soft, and epigastric TTP without rebound or guarding.   Neurological: No focal deficits noted. No asterixis.    Intake/Output:    Intake/Output Summary (Last 24 hours) at 10/18/2024 0923  Last data filed at 10/17/2024 1930  Gross per 24 hour   Intake 240 ml   Output --   Net 240 ml        Current Medications:  Current Facility-Administered Medications   Medication Dose Route Frequency Provider Last Rate Last Admin    acetaminophen (TYLENOL) tablet 650 mg  650 mg Oral Q4H PRN Sharri Irving MD   650 mg at 10/17/24 0604    gabapentin (NEURONTIN) capsule 800 mg  800 mg Oral TID Klaus Gonzalez DO   800 mg at 10/18/24 0856    FLUoxetine (PROZAC) capsule 40 mg  40 mg Oral Daily Klaus Gonzalez DO   40 mg at 10/18/24 0856    metoprolol succinate (TOPROL XL) extended release tablet 25 mg  25 mg Oral Daily Reina De La O APRN - CNP   25 mg at 10/18/24 0856    piperacillin-tazobactam (ZOSYN) 3,375 mg in sodium chloride 0.9 % 50 mL IVPB (mini-bag)  3,375 mg IntraVENous Q8H Pasquale Alba MD   Stopped at 
  4 Eyes Skin Assessment and Patient belongings     The patient is being assess for  Shift Handoff    I agree that 2 Nurses have performed a thorough Head to Toe Skin Assessment on the patient. ALL assessment sites listed below have been assessed.       Areas assessed by both nurses: Justo/Trudy  [x]   Head, Face, and Ears   [x]   Shoulders, Back, and Chest  [x]   Arms, Elbows, and Hands   [x]   Coccyx, Sacrum, and IschIum  [x]   Legs, Feet, and Heels        Does the Patient have Skin Breakdown?  No         Suraj Prevention initiated:  Yes   Wound Care Orders initiated:  No      [facility] nurse consulted for Pressure Injury (Stage 3,4, Unstageable, DTI, NWPT, and Complex wounds), New and Established Ostomies:  No      I agree that 2 Nurses have reviewed patient belongings with the patient/family and documented in the flowsheet upon admission or transfer to the unit.     Belongings  Dental Appliances: None  Vision - Corrective Lenses: Eyeglasses  Hearing Aid: None  Clothing: Footwear, Socks, Sweater, Shirt, Pants  Jewelry: Ring  Body Piercings Removed: No  Electronic Devices: Cell Phone  Weapons (Notify Protective Services/Security): None  Other Valuables: At home  Home Medications: None  Valuables Given To: Family (Comment)  Provide Name(s) of Who Valuable(s) Were Given To: Fernando       Nurse 1 eSignature: Electronically signed by Trudy Corrales RN on 10/18/24 at 10:01 PM EDT    **SHARE this note so that the co-signing nurse is able to place an eSignature**    Nurse 2 eSignature: Electronically signed by Justo Sarkar RN on 10/18/24 at 10:37 PM EDT  
  Pharmacy Note - Extended Infusion Beta-Lactam Adjustment    Piperacillin/Tazobactam 3375mg Q8h for treatment of Intra-abdominal Infection. Per [facility] Extended Infusion Beta-Lactam Policy, piperacillin/tazobactam will be changed to 4500mg loading dose followed by 3375mg Q8h extended infusion    Estimated Creatinine Clearance: Estimated Creatinine Clearance: 92 mL/min (based on SCr of 0.7 mg/dL).    BMI: Body mass index is 25.09 kg/m².    Please call with any questions.    Thank you,    Elisa Singh, [facility]    
  Physician Progress Note      PATIENT:               CHOCO NATH  [facility] #:                  [id number]  :                       1970  ADMIT DATE:       10/16/2024 8:00 AM  DISCH DATE:  RESPONDING  PROVIDER #:        Pasquale Alba MD          QUERY TEXT:    Internal Medicine,    Pt admitted with  chest pain, abdominal pain and found to have NSTEMI and   Takotsubo cardiomyopathy.  Gastroenterology was consulted for rectal bleeding   and  colitis was  found on admission CT of abdomen. If possible, please   document the type of colitis in the medical record.    The medical record reflects the following:  Risk Factors:  Ozempic? stress  Clinical Indicators:  abdominal pain, nausea, per GI consult documentation   bloody diarrhea and nausea started on 10/16,  Treatment:  labs, imaging, IVF, PPI, IV Zosyn, medical management    Thank you,  Peyton Mello RN CDS  Options provided:  -- Bacterial Colitis  -- Drug Induced colitis, please document name of drug  -- Colitis due to other etiology, Please document other etiology.  -- Other - I will add my own diagnosis  -- Disagree - Not applicable / Not valid  -- Disagree - Clinically unable to determine / Unknown  -- Refer to Clinical Documentation Reviewer    PROVIDER RESPONSE TEXT:    This patient has bacterial colitis.    Query created by: Peyton Mello on 10/18/2024 9:24 AM      Electronically signed by:  Pasquale Alba MD 10/19/2024 1:48 PM          
  Physician Progress Note      PATIENT:               CHOCO NATH  [facility] #:                  [id number]  :                       1970  ADMIT DATE:       10/16/2024 8:00 AM  DISCH DATE:  RESPONDING  PROVIDER #:        Pasquale Alba MD          QUERY TEXT:    Internal Medicine,    Pt admitted with NSTEMI and noted to have elevated lactate. If possible,   please document in the progress notes and discharge summary if you are   evaluating and/or treating any of the following:    The medical record reflects the following:  Risk Factors: NSTEMI  Clinical Indicators: lactate 3.0  Treatment:  labs, IVF, medical management    Thank you,  Peyton Mello RN CDS  Options provided:  -- Lactic Acidosis  -- Other - I will add my own diagnosis  -- Disagree - Not applicable / Not valid  -- Disagree - Clinically unable to determine / Unknown  -- Refer to Clinical Documentation Reviewer    PROVIDER RESPONSE TEXT:    This patient has lactic acidosis.    Query created by: Peyton Mello on 10/17/2024 11:58 AM      Electronically signed by:  Pasquale Alba MD 10/17/2024 5:26 PM          
  Physician Progress Note      PATIENT:               CHOCO NATH  [facility] #:                  [id number]  :                       1970  ADMIT DATE:       10/16/2024 8:00 AM  DISCH DATE:        10/20/2024 4:35 PM  RESPONDING  PROVIDER #:        SARAH LUCIANO          QUERY TEXT:    Internal Medicine,    Pt admitted with NSTEMI and bacterial colitis due to C-diff. Pt noted to have   HR over 100, WBC and lactate elevation. If possible, please document in the   progress notes and discharge summary if you are evaluating and /or treating   any of the following:    The medical record reflects the following:  Risk Factors: c-diff  Clinical Indicators:  temp rise to 100, HR >90, verified up to 103, WBC  rise   to 22 with  current rend down to 17.3, lactate 3.0 on  10/17 with acidosis   confirmed, procalcitonin rise to 0.44  IV zosyn started on 10/17  Treatment:  labs, imaging, IV antibiotics, medical management    Thank you,  Peyton Mello RN CDS  Options provided:  -- Sepsis due to C. Diff colitis present on admission  -- Sepsis, due to  C. Diff colitis developed following admission  -- Sepsis was ruled out  -- Other - I will add my own diagnosis  -- Disagree - Not applicable / Not valid  -- Disagree - Clinically unable to determine / Unknown  -- Refer to Clinical Documentation Reviewer    PROVIDER RESPONSE TEXT:    This patient has sepsis due to C. Diff colitis which was present on admission.    Query created by: Peyton Mello on 10/22/2024 7:54 PM      Electronically signed by:  SARAH LUCIANO 10/23/2024 12:31 PM          
  Physician Progress Note      PATIENT:               CHOCO NATH  [facility] #:                  [id number]  :                       1970  ADMIT DATE:       10/16/2024 8:00 AM  DISCH DATE:  RESPONDING  PROVIDER #:        SARAH LUCIANO          QUERY TEXT:    Internal Medicine,    Patient admitted with chest pain underwent HC and was found to have Takotsubo    cardiomyopathy.   Noted documentation of NSTEMI by Internal Medicine.    cardiology documents troponin elevation. If possible, please clarify in   progress notes and discharge summary if you are evaluating and /or treating   any of the following:    The medical record reflects the following:  Risk Factors: HTN  Clinical Indicators: Cardiology documents: \"ELEVATED TROPONIN: s/p LHC with no   CAD, not ACS, TAKOTSUBO CARDIOMYOPATHY: EF 30%\"  Internal Medicine documents: \"NSTEMI-Elevated troponins in ED-Takotsubo   cardiomyopathy with EF of about 30%  Treatment:  labs, Cardiology consult, LHC, heparin, medical management and   supportive care    Thank you,  Peyton Mello RN CDS  Options provided:  -- NSTEMI confirmed  -- NSTEMI  ruled out  -- Other - I will add my own diagnosis  -- Disagree - Not applicable / Not valid  -- Disagree - Clinically unable to determine / Unknown  -- Refer to Clinical Documentation Reviewer    PROVIDER RESPONSE TEXT:    After study, NSTEMI confirmed    Query created by: Peyton Mello on 10/18/2024 8:47 AM      Electronically signed by:  SARAH LUCIANO 10/19/2024 9:10 AM          
4 Eyes Skin Assessment     The patient is being assess for   Admission    I agree that 2 RN's have performed a thorough Head to Toe Skin Assessment on the patient. ALL assessment sites listed below have been assessed.      Areas assessed by both nurses:   [x]   Head, Face, and Ears   [x]   Shoulders, Back, and Chest, Abdomen  [x]   Arms, Elbows, and Hands   [x]   Coccyx, Sacrum, and Ischium  [x]   Legs, Feet, and Heels          **SHARE this note so that the co-signing nurse is able to place an eSignature**    Co-signer eSignature: Electronically signed by Steffanie Malhotra RN on 10/16/24 at 6:48 PM EDT    Does the Patient have Skin Breakdown?  No          Suraj Prevention initiated:  Yes   Wound Care Orders initiated:  NA      [facility] nurse consulted for Pressure Injury (Stage 3,4, Unstageable, DTI, NWPT, Complex wounds)and New or Established Ostomies:  NA      Primary Nurse eSignature: Electronically signed by Indira Joy RN on 10/16/24 at 6:46 PM EDT       
Assessment completed and documented. VSS. A/ox4. Rates pain 6/10, prn med given. Bed locked and in lowest position. Bedside table and call light within reach. Denies further needs at this time.  
Assessment completed and documented. VSS. A/ox4. Rates pain 7/10, PRN med given. Bed locked and in lowest position. Bedside table and call light within reach. Denies further needs at this time.  
[facility]   Daily Progress Note    Admit Date:  10/16/2024  HPI:    Chief Complaint   Patient presents with    Abdominal Pain     Mid-abdominal pain that started a few hours ago, gallbladder previously removed, +N/V/D      Jessica Cornelius presented with abdominal pain    Cardiology consulted for elevated troponin    PMH: Hypertension, Carpal tunnel, arthritis, depression    [facility] 10/16/2024 with normal coronary arteries, EF 3%, EDP 3    Subjective:  Ms. Cornelius complains of abdominal pain - worst she has ever had, along with nausea.  Denies any chest pain or shortness of breath.     Objective:   Patient Vitals for the past 24 hrs:   BP Temp Temp src Pulse Resp SpO2   10/17/24 1211 (!) 127/91 97.6 °F (36.4 °C) Oral 93 18 98 %   10/17/24 0921 -- -- -- 82 -- --   10/17/24 0858 (!) 124/90 98.3 °F (36.8 °C) Oral (!) 103 18 97 %   10/17/24 0515 (!) 135/96 97.6 °F (36.4 °C) Oral 97 16 97 %   10/17/24 0000 (!) 129/93 97.6 °F (36.4 °C) Oral 100 16 97 %   10/16/24 2000 (!) 133/96 98.7 °F (37.1 °C) Oral (!) 101 16 96 %   10/16/24 1710 (!) 126/93 -- -- 86 -- --   10/16/24 1613 118/78 100 °F (37.8 °C) Oral 91 16 95 %   10/16/24 1426 121/78 99 °F (37.2 °C) Oral 81 18 94 %   10/16/24 1401 117/83 -- -- 80 18 96 %   10/16/24 1400 -- -- -- 81 20 96 %   10/16/24 1345 111/83 -- -- 77 18 96 %   10/16/24 1332 106/80 -- -- 79 17 96 %   10/16/24 1330 -- -- -- 76 17 97 %   10/16/24 1315 100/75 -- -- 73 18 97 %   10/16/24 1300 101/74 -- -- 70 16 98 %   10/16/24 1247 109/82 -- -- 75 -- 99 %     No intake or output data in the 24 hours ending 10/17/24 1239  Wt Readings from Last 3 Encounters:   10/16/24 74.8 kg (165 lb)   12/14/23 75.7 kg (166 lb 12.8 oz)   07/17/23 86.6 kg (191 lb)         ASSESSMENT:   ELEVATED TROPONIN: s/p LHC with no CAD, not ACS  TAKOTSUBO CARDIOMYOPATHY: EF 30%  HYPERTENSION: sub optimal, not on any treatment prior to admission  ABD PAIN: per IM and GI  Bright red rectal bleeding      PLAN:  Echo today  Add 
PS to Tom, DIAMANTE @ 0343 - \"Hi there, notifying you per protocol that pt's c diff is POSITIVE. Thank you!\"  
Patient transferred to room 342 with all belongings. Report given to ROB Magaña.  
Phone report received from Linda RIVAS from A1. Pt transported to room 342 from 116 via bed by Linda RIVAS. Pt is alert and oriented. VSS. RA. Pt c/o abdominal pain but tolerable at this time after getting pain medication prior to transferring to C3. Call light within reach. Bed side table within reach. Wheels locked. Bed in lowest position.Pt instructed to call out for assistance. Pt expressesed understanding & calls out appropritately. All care per orders. Electronically signed by Archana Vasquez RN on 10/18/2024 at 8:22 PM    
Pt stated she was having upper gastric pain pt has no PRN order for pain informed DR. Sharri Irving new order placed.   
Pt's verbal and written discharge instructions given, all questions answered. IV removed. Follow up appointments made and discussed. Instructed pt to follow up with cardiology, PCP, and GI. New medications reviewed and sent to pt's preferred pharmacy.  to pickup on the way here. Pt left in stable condition via wheelchair to private car with family.     
RN walked pt to bathroom, noted large amount of blood in toilet. Pt unsure if urine or stool. Hospitalist notified, hats placed in toilet to collect urine & stool at next occurrence.     Addendum @ 17:44  Pt had episode of emesis, PRN zofran administered. She also had a small bowel movement noted to be mixed terrell & dark red blood. Dr. Castillo aware.  
Report given to patients nurse Indira at bedside.  Pt transferred to room. Site remains unremarkable.  No c/o voiced.  
Report given to patients nurse. CMU called and monitor is on and verified.  
[facility]   Daily Progress Note    Admit Date:  10/16/2024  HPI:    Chief Complaint   Patient presents with    Abdominal Pain     Mid-abdominal pain that started a few hours ago, gallbladder previously removed, +N/V/D      Jessica Cornelius presented with abdominal pain    Cardiology consulted for elevated troponin    PMH: Hypertension, Carpal tunnel, arthritis, depression    [facility] 10/16/2024 with normal coronary arteries, EF 30%, EDP 3    Subjective:  Ms. Cornelius denies any chest pain or shortness of breath    Objective:   Patient Vitals for the past 24 hrs:   BP Temp Temp src Pulse Resp SpO2   10/18/24 0821 106/72 -- Oral 99 17 98 %   10/18/24 0425 -- -- -- -- 16 --   10/18/24 0353 110/77 98.4 °F (36.9 °C) Axillary 87 16 95 %   10/18/24 0018 109/75 98.4 °F (36.9 °C) Oral 94 16 97 %   10/17/24 2308 -- -- -- -- 16 --   10/17/24 1930 115/88 99.1 °F (37.3 °C) Oral 97 17 96 %   10/17/24 1829 -- -- -- -- 16 --   10/17/24 1654 117/81 96.8 °F (36 °C) Oral 93 16 96 %   10/17/24 1211 (!) 127/91 97.6 °F (36.4 °C) Oral 93 18 98 %       Intake/Output Summary (Last 24 hours) at 10/18/2024 1116  Last data filed at 10/17/2024 1930  Gross per 24 hour   Intake 240 ml   Output --   Net 240 ml     Wt Readings from Last 3 Encounters:   10/16/24 74.8 kg (165 lb)   12/14/23 75.7 kg (166 lb 12.8 oz)   07/17/23 86.6 kg (191 lb)     Reviewed with Dr. Calero and Dr. Alba    ASSESSMENT:   ELEVATED TROPONIN: s/p [facility] with no CAD, not ACS  TAKOTSUBO CARDIOMYOPATHY: EF 30% by LVG, 25-30% by echo, tolerating low-dose beta-blocker  HYPERTENSION: sub optimal, not on any treatment prior to admission, in remote past she had been on lisinopril  ABD PAIN: per IM and GI  Bright red rectal bleeding  Acute colitis      PLAN:  Tolerating Toprol-XL 25 mg daily, will increase to 25 mg twice daily  Will hold off on ACEi/ARB/ARNI. MRA and SGLT2i during acute illness  Will arrange follow up in office and work to optimize GDMT as outpatient  Nothing 
Shift assessment completed.  Pt A&O x4, VSS. Pt reporting some mild pain but tolerable after PO meds. Pt not taking in a lot of PO food but managing liquids well. Non skid socks on, pt independent with ambulation. Bed locked and in lowest position. Call light and bedside table within reach. Will continue to monitor.    
Shift assessment completed.  Pt A&O, VSS.  Pt continues to c/o mid to upper abd pain.Rating 7/10, PRN pain medication given per order.  Pt has only been able to tolerate minimal amounts of fluid.  Continue to c/o nausea, PRN anti-emetic given per order.  Denies any needs at this time.  Bed locked and in lowest position, side rails up 2/4.  Call light within reach.    
to monitor    Lactic acidosis  -Elevated lactate in the setting of NSTEMI  -Appears resolved on 10/18  -Continue to monitor    Nausea and vomiting  -Nausea appears resolved  -Patient reports no vomiting since hospital admission  -Continue to monitor    Diet ADULT DIET; Clear Liquid   DVT Prophylaxis [] Lovenox, []  Heparin, [] SCDs, [] Ambulation,  [] Eliquis, [] Xarelto  [] Coumadin   Code Status Full Code   Disposition From: Home  Expected Disposition: Home  Estimated Date of Discharge: 2 to 3 days  Patient requires continued admission due to Takotsubo cardiomyopathy   Surrogate Decision Maker/ POA       Personally reviewed Lab Studies and Imaging     Subjective:     Chief Complaint: Abdominal pain    Jessica Cornelius is a 54 y.o. female who presents to the emergency department complaining of abdominal pain, nausea and vomiting.  Patient states pain began several hours ago.  She does complain of nausea, vomiting and diarrhea.  She states she has a history of pancreatitis.  She has previously had her gallbladder removed.  Denies any drinking or drug use.  She is currently on Ozempic.  Denies any dysuria or hematuria.  No chest pain, shortness of breath or cough.  Her abdominal pain is generalized but also more focused epigastric. No fevers or chills.       Review of Systems:      Pertinent positives and negatives discussed in HPI    Objective:     Intake/Output Summary (Last 24 hours) at 10/18/2024 0640  Last data filed at 10/17/2024 1930  Gross per 24 hour   Intake 240 ml   Output --   Net 240 ml      Vitals:   Vitals:    10/17/24 2308 10/18/24 0018 10/18/24 0353 10/18/24 0425   BP:  109/75 110/77    Pulse:  94 87    Resp: 16 16 16 16   Temp:  98.4 °F (36.9 °C) 98.4 °F (36.9 °C)    TempSrc:  Oral Axillary    SpO2:  97% 95%    Weight:       Height:             Physical Exam:      General appearance: No apparent distress, appears stated age and cooperative.  HEENT: Pupils equal, round, and reactive to light. 
appears stated age and cooperative.  HEENT: Pupils equal, round, and reactive to light. Conjunctivae/corneas clear.  Neck: Supple, with full range of motion. No jugular venous distention. Trachea midline.  Respiratory:  Normal respiratory effort. Clear to auscultation, bilaterally without Rales/Wheezes/Rhonchi.  Cardiovascular: Regular rate and rhythm without murmurs, rubs or gallops.  Abdomen: Soft, tenderness to palpation in the LLQ, LUQ, epigastric region, non-distended with normal bowel sounds.  Musculoskeletal: No clubbing, cyanosis or edema bilaterally.  Full range of motion without deformity.  Skin: Skin color, texture, turgor normal.  No rashes or lesions.  Neurologic:  Neurovascularly intact without any focal sensory/motor deficits.  Psychiatric: Alert and oriented, thought content appropriate, normal insight  Capillary Refill: Brisk, 2 seconds, normal     Medications:   Medications:    metoprolol succinate  25 mg Oral BID    gabapentin  800 mg Oral TID    FLUoxetine  40 mg Oral Daily    piperacillin-tazobactam  3,375 mg IntraVENous Q8H    sodium chloride flush  5-40 mL IntraVENous 2 times per day    insulin lispro  0-4 Units SubCUTAneous 4x Daily AC & HS    pantoprazole  40 mg IntraVENous BID      Infusions:    sodium chloride      dextrose       PRN Meds: oxyCODONE-acetaminophen, 1 tablet, Q4H PRN  acetaminophen, 650 mg, Q4H PRN  sodium chloride flush, 5-40 mL, PRN  sodium chloride, , PRN  hydrALAZINE, 10 mg, Q10 Min PRN  glucose, 4 tablet, PRN  dextrose bolus, 125 mL, PRN   Or  dextrose bolus, 250 mL, PRN  glucagon (rDNA), 1 mg, PRN  dextrose, , Continuous PRN  prochlorperazine, 10 mg, Q6H PRN        Labs and Imaging   Cardiac procedure    Result Date: 10/16/2024  Post Cardiac Catheterization Note. Full details available in procedure log  DATE: 10/16/2024 PATIENT: Jessica Cornelius MRN: [id number]  Procedure Performed: [facility] CORS LVG  Procedure Findings: Normal cors Apical ballooning with EF of about 30% 
\"BLOODCULT2\"  Organism: No results found for: \"ORG\"      Electronically signed by Klaus Gonzalez DO on 10/17/2024 at 7:29 AM

## 2024-10-24 ENCOUNTER — OFFICE VISIT (OUTPATIENT)
Dept: FAMILY MEDICINE CLINIC | Age: 54
End: 2024-10-24

## 2024-10-24 VITALS
BODY MASS INDEX: 24.07 KG/M2 | SYSTOLIC BLOOD PRESSURE: 100 MMHG | WEIGHT: 158.8 LBS | OXYGEN SATURATION: 99 % | HEART RATE: 101 BPM | HEIGHT: 68 IN | RESPIRATION RATE: 16 BRPM | DIASTOLIC BLOOD PRESSURE: 74 MMHG

## 2024-10-24 DIAGNOSIS — Z09 HOSPITAL DISCHARGE FOLLOW-UP: ICD-10-CM

## 2024-10-24 DIAGNOSIS — I51.81 TAKOTSUBO CARDIOMYOPATHY: ICD-10-CM

## 2024-10-24 DIAGNOSIS — A04.72 C. DIFFICILE COLITIS: Primary | ICD-10-CM

## 2024-10-24 DIAGNOSIS — R10.13 EPIGASTRIC PAIN: ICD-10-CM

## 2024-10-24 RX ORDER — OXYCODONE AND ACETAMINOPHEN 7.5; 325 MG/1; MG/1
1 TABLET ORAL 2 TIMES DAILY
Qty: 10 TABLET | Refills: 0 | Status: SHIPPED | OUTPATIENT
Start: 2024-10-24 | End: 2024-10-29

## 2024-10-24 RX ORDER — PANTOPRAZOLE SODIUM 40 MG/1
40 TABLET, DELAYED RELEASE ORAL
Qty: 60 TABLET | Refills: 0 | Status: SHIPPED | OUTPATIENT
Start: 2024-10-24

## 2024-10-24 RX ORDER — ONDANSETRON 4 MG/1
4 TABLET, ORALLY DISINTEGRATING ORAL 3 TIMES DAILY PRN
Qty: 21 TABLET | Refills: 0 | Status: SHIPPED | OUTPATIENT
Start: 2024-10-24

## 2024-10-24 SDOH — ECONOMIC STABILITY: FOOD INSECURITY: WITHIN THE PAST 12 MONTHS, THE FOOD YOU BOUGHT JUST DIDN'T LAST AND YOU DIDN'T HAVE MONEY TO GET MORE.: NEVER TRUE

## 2024-10-24 SDOH — ECONOMIC STABILITY: FOOD INSECURITY: WITHIN THE PAST 12 MONTHS, YOU WORRIED THAT YOUR FOOD WOULD RUN OUT BEFORE YOU GOT MONEY TO BUY MORE.: NEVER TRUE

## 2024-10-24 SDOH — ECONOMIC STABILITY: INCOME INSECURITY: HOW HARD IS IT FOR YOU TO PAY FOR THE VERY BASICS LIKE FOOD, HOUSING, MEDICAL CARE, AND HEATING?: NOT HARD AT ALL

## 2024-10-24 ASSESSMENT — PATIENT HEALTH QUESTIONNAIRE - PHQ9
SUM OF ALL RESPONSES TO PHQ QUESTIONS 1-9: 0
SUM OF ALL RESPONSES TO PHQ QUESTIONS 1-9: 0
10. IF YOU CHECKED OFF ANY PROBLEMS, HOW DIFFICULT HAVE THESE PROBLEMS MADE IT FOR YOU TO DO YOUR WORK, TAKE CARE OF THINGS AT HOME, OR GET ALONG WITH OTHER PEOPLE: NOT DIFFICULT AT ALL
1. LITTLE INTEREST OR PLEASURE IN DOING THINGS: NOT AT ALL
SUM OF ALL RESPONSES TO PHQ9 QUESTIONS 1 & 2: 0
8. MOVING OR SPEAKING SO SLOWLY THAT OTHER PEOPLE COULD HAVE NOTICED. OR THE OPPOSITE, BEING SO FIGETY OR RESTLESS THAT YOU HAVE BEEN MOVING AROUND A LOT MORE THAN USUAL: NOT AT ALL
6. FEELING BAD ABOUT YOURSELF - OR THAT YOU ARE A FAILURE OR HAVE LET YOURSELF OR YOUR FAMILY DOWN: NOT AT ALL
SUM OF ALL RESPONSES TO PHQ QUESTIONS 1-9: 0
SUM OF ALL RESPONSES TO PHQ QUESTIONS 1-9: 0
7. TROUBLE CONCENTRATING ON THINGS, SUCH AS READING THE NEWSPAPER OR WATCHING TELEVISION: NOT AT ALL
9. THOUGHTS THAT YOU WOULD BE BETTER OFF DEAD, OR OF HURTING YOURSELF: NOT AT ALL
4. FEELING TIRED OR HAVING LITTLE ENERGY: NOT AT ALL
3. TROUBLE FALLING OR STAYING ASLEEP: NOT AT ALL
5. POOR APPETITE OR OVEREATING: NOT AT ALL
2. FEELING DOWN, DEPRESSED OR HOPELESS: NOT AT ALL

## 2024-10-24 NOTE — PROGRESS NOTES
[facility]  10/24/2024    Jessica Cornelius (:  1970) is a 54 y.o. female, here for evaluation of the following medical concerns:    Chief Complaint   Patient presents with    Follow-Up from Hospital     Pt is here a hospital follow up, pt was in [facility] from 10/16-10/20 for Takotsubo cardiomyopathy    Abdominal Pain        ASSESSMENT/ PLAN  Assessment & Plan  C. difficile colitis   {A/P Summary:4113430740}    Orders:    Lipase; Future    CBC with Auto Differential; Future    Comprehensive Metabolic Panel; Future    [facility]    Epigastric pain   {A/P Summary:[id number]}    Orders:    Lipase; Future    CBC with Auto Differential; Future    Comprehensive Metabolic Panel; Future    [facility]    oxyCODONE-acetaminophen (PERCOCET) 7.5-325 MG per tablet; Take 1 tablet by mouth 2 times daily for 5 days. Intended supply: 30 days Max Daily Amount: 2 tablets         No follow-ups on file.    HPI  Jessica Marquezden ***    ROS  Review of Systems    HISTORIES  Current Outpatient Medications on File Prior to Visit   Medication Sig Dispense Refill    sucralfate (CARAFATE) 1 GM/10ML suspension Take 10 mLs by mouth 4 times daily for 14 days 560 mL 0    metoprolol succinate (TOPROL XL) 25 MG extended release tablet Take 1 tablet by mouth in the morning and at bedtime 30 tablet 3    vancomycin (FIRVANQ) 50 MG/ML SOLR oral solution Take 2.5 mLs by mouth in the morning, at noon, in the evening, and at bedtime for 9 days 90 mL 0    FLUoxetine (PROZAC) 40 MG capsule TAKE 1 CAPSULE BY MOUTH DAILY 30 capsule 2    gabapentin (NEURONTIN) 800 MG tablet Take 1 tablet by mouth 3 times daily for 90 days. 90 tablet 2    blood glucose monitor strips Test 2 times a day & as needed for symptoms of irregular blood glucose. Dispense sufficient amount for indicated testing frequency plus additional to accommodate PRN

## 2024-10-24 NOTE — PROGRESS NOTES
Post-Discharge Transitional Care  Follow Up      Jessica Cornelius   YOB: 1970    Date of Office Visit:  10/24/2024  Date of Hospital Admission: 10/16/24  Date of Hospital Discharge: 10/20/24  Risk of hospital readmission (high >=14%. Medium >=10%) :Readmission Risk Score: 9.9      Care management risk score Rising risk (score 2-5) and Complex Care (Scores >=6): No Risk Score On File     Non face to face  following discharge, date last encounter closed (first attempt may have been earlier): 10/21/2024    Call initiated 2 business days of discharge: Yes    ASSESSMENT/PLAN:   C. difficile colitis  -     [facility]  -     Comprehensive Metabolic Panel; Future  -     Lipase; Future  -     CBC with Auto Differential; Future  Epigastric pain  -     [facility]  -     oxyCODONE-acetaminophen (PERCOCET) 7.5-325 MG per tablet; Take 1 tablet by mouth 2 times daily for 5 days. Intended supply: 30 days Max Daily Amount: 2 tablets, Disp-10 tablet, R-0Normal  -     Comprehensive Metabolic Panel; Future  -     Lipase; Future  -     CBC with Auto Differential; Future  Hospital discharge follow-up  -     PA DISCHARGE MEDS RECONCILED W/ CURRENT OUTPATIENT MED LIST      Medical Decision Making: high complexity  Return in 1 month (on 11/24/2024).    On this date 10/24/2024 I have spent 30 minutes reviewing previous notes, test results and face to face with the patient discussing the diagnosis and importance of compliance with the treatment plan as well as documenting on the day of the visit.   Tenderness and guarding with palpation in epigastric area.  She came off the table when I pressed on the area of the pancreas.  CT of the abdomen did not show pancreatitis and lipase was normal in the hospital.  Will repeat lipase to rule out pancreatitis.  Recommend continued clear liquids until labs result.  Will start sucralfate,

## 2024-10-29 DIAGNOSIS — R10.13 EPIGASTRIC PAIN: ICD-10-CM

## 2024-10-29 RX ORDER — OXYCODONE AND ACETAMINOPHEN 7.5; 325 MG/1; MG/1
1 TABLET ORAL 2 TIMES DAILY
Qty: 10 TABLET | Refills: 0 | OUTPATIENT
Start: 2024-10-29 | End: 2024-11-03

## 2024-10-30 DIAGNOSIS — I51.81 TAKOTSUBO CARDIOMYOPATHY: ICD-10-CM

## 2024-10-30 DIAGNOSIS — R10.13 EPIGASTRIC PAIN: Primary | ICD-10-CM

## 2024-10-30 DIAGNOSIS — M54.2 NECK PAIN: ICD-10-CM

## 2024-10-30 DIAGNOSIS — A04.72 C. DIFFICILE COLITIS: ICD-10-CM

## 2024-10-30 DIAGNOSIS — R10.13 EPIGASTRIC PAIN: ICD-10-CM

## 2024-10-30 LAB
ALBUMIN SERPL-MCNC: 3.5 G/DL (ref 3.4–5)
ALBUMIN/GLOB SERPL: 1.7 {RATIO} (ref 1.1–2.2)
ALP SERPL-CCNC: 110 U/L (ref 40–129)
ALT SERPL-CCNC: 40 U/L (ref 10–40)
ANION GAP SERPL CALCULATED.3IONS-SCNC: 10 MMOL/L (ref 3–16)
AST SERPL-CCNC: 15 U/L (ref 15–37)
BASOPHILS # BLD: 0.1 K/UL (ref 0–0.2)
BASOPHILS NFR BLD: 0.8 %
BILIRUB SERPL-MCNC: <0.2 MG/DL (ref 0–1)
BUN SERPL-MCNC: 13 MG/DL (ref 7–20)
CALCIUM SERPL-MCNC: 8.6 MG/DL (ref 8.3–10.6)
CHLORIDE SERPL-SCNC: 104 MMOL/L (ref 99–110)
CO2 SERPL-SCNC: 25 MMOL/L (ref 21–32)
CREAT SERPL-MCNC: 0.8 MG/DL (ref 0.6–1.1)
DEPRECATED RDW RBC AUTO: 13.5 % (ref 12.4–15.4)
EOSINOPHIL # BLD: 0.2 K/UL (ref 0–0.6)
EOSINOPHIL NFR BLD: 1.4 %
GFR SERPLBLD CREATININE-BSD FMLA CKD-EPI: 87 ML/MIN/{1.73_M2}
GLUCOSE SERPL-MCNC: 94 MG/DL (ref 70–99)
HCT VFR BLD AUTO: 36 % (ref 36–48)
HGB BLD-MCNC: 12.3 G/DL (ref 12–16)
LIPASE SERPL-CCNC: 60 U/L (ref 13–60)
LYMPHOCYTES # BLD: 2.5 K/UL (ref 1–5.1)
LYMPHOCYTES NFR BLD: 23 %
MCH RBC QN AUTO: 32.8 PG (ref 26–34)
MCHC RBC AUTO-ENTMCNC: 34.3 G/DL (ref 31–36)
MCV RBC AUTO: 95.6 FL (ref 80–100)
MONOCYTES # BLD: 0.8 K/UL (ref 0–1.3)
MONOCYTES NFR BLD: 7.6 %
NEUTROPHILS # BLD: 7.2 K/UL (ref 1.7–7.7)
NEUTROPHILS NFR BLD: 67.2 %
PLATELET # BLD AUTO: 403 K/UL (ref 135–450)
PMV BLD AUTO: 8.3 FL (ref 5–10.5)
POTASSIUM SERPL-SCNC: 4.3 MMOL/L (ref 3.5–5.1)
PROT SERPL-MCNC: 5.6 G/DL (ref 6.4–8.2)
RBC # BLD AUTO: 3.76 M/UL (ref 4–5.2)
SODIUM SERPL-SCNC: 139 MMOL/L (ref 136–145)
WBC # BLD AUTO: 10.7 K/UL (ref 4–11)

## 2024-10-30 RX ORDER — OXYCODONE AND ACETAMINOPHEN 5; 325 MG/1; MG/1
1 TABLET ORAL EVERY 6 HOURS PRN
Qty: 12 TABLET | Refills: 0 | Status: SHIPPED | OUTPATIENT
Start: 2024-10-30 | End: 2024-11-02

## 2024-11-10 ENCOUNTER — APPOINTMENT (OUTPATIENT)
Dept: CT IMAGING | Age: 54
End: 2024-11-10
Payer: COMMERCIAL

## 2024-11-10 ENCOUNTER — HOSPITAL ENCOUNTER (EMERGENCY)
Age: 54
Discharge: HOME | End: 2024-11-11
Attending: EMERGENCY MEDICINE
Payer: COMMERCIAL

## 2024-11-10 DIAGNOSIS — R11.2 NAUSEA VOMITING AND DIARRHEA: ICD-10-CM

## 2024-11-10 DIAGNOSIS — R10.13 ABDOMINAL PAIN, EPIGASTRIC: Primary | ICD-10-CM

## 2024-11-10 DIAGNOSIS — R19.7 NAUSEA VOMITING AND DIARRHEA: ICD-10-CM

## 2024-11-10 LAB
ALBUMIN SERPL-MCNC: 3.1 G/DL (ref 3.4–5)
ALBUMIN/GLOB SERPL: 1.6 {RATIO} (ref 1.1–2.2)
ALP SERPL-CCNC: 75 U/L (ref 40–129)
ALT SERPL-CCNC: 13 U/L (ref 10–40)
ANION GAP SERPL CALCULATED.3IONS-SCNC: 9 MMOL/L (ref 3–16)
AST SERPL-CCNC: 11 U/L (ref 15–37)
BASOPHILS # BLD: 0.1 K/UL (ref 0–0.2)
BASOPHILS NFR BLD: 0.4 %
BILIRUB SERPL-MCNC: <0.2 MG/DL (ref 0–1)
BUN SERPL-MCNC: 19 MG/DL (ref 7–20)
CALCIUM SERPL-MCNC: 8.1 MG/DL (ref 8.3–10.6)
CHLORIDE SERPL-SCNC: 106 MMOL/L (ref 99–110)
CO2 SERPL-SCNC: 24 MMOL/L (ref 21–32)
CREAT SERPL-MCNC: 0.7 MG/DL (ref 0.6–1.1)
DEPRECATED RDW RBC AUTO: 15.1 % (ref 12.4–15.4)
EOSINOPHIL # BLD: 0.5 K/UL (ref 0–0.6)
EOSINOPHIL NFR BLD: 3.6 %
GFR SERPLBLD CREATININE-BSD FMLA CKD-EPI: >90 ML/MIN/{1.73_M2}
GLUCOSE SERPL-MCNC: 135 MG/DL (ref 70–99)
HCT VFR BLD AUTO: 33.9 % (ref 36–48)
HGB BLD-MCNC: 11.3 G/DL (ref 12–16)
LIPASE SERPL-CCNC: 43 U/L (ref 13–60)
LYMPHOCYTES # BLD: 1.2 K/UL (ref 1–5.1)
LYMPHOCYTES NFR BLD: 9.5 %
MCH RBC QN AUTO: 32.6 PG (ref 26–34)
MCHC RBC AUTO-ENTMCNC: 33.4 G/DL (ref 31–36)
MCV RBC AUTO: 97.8 FL (ref 80–100)
MONOCYTES # BLD: 0.6 K/UL (ref 0–1.3)
MONOCYTES NFR BLD: 4.9 %
NEUTROPHILS # BLD: 10.6 K/UL (ref 1.7–7.7)
NEUTROPHILS NFR BLD: 81.6 %
PLATELET # BLD AUTO: 462 K/UL (ref 135–450)
PMV BLD AUTO: 6.9 FL (ref 5–10.5)
POTASSIUM SERPL-SCNC: 4.1 MMOL/L (ref 3.5–5.1)
PROT SERPL-MCNC: 5 G/DL (ref 6.4–8.2)
RBC # BLD AUTO: 3.46 M/UL (ref 4–5.2)
SODIUM SERPL-SCNC: 139 MMOL/L (ref 136–145)
TROPONIN, HIGH SENSITIVITY: 9 NG/L (ref 0–14)
WBC # BLD AUTO: 13 K/UL (ref 4–11)

## 2024-11-10 PROCEDURE — 74177 CT ABD & PELVIS W/CONTRAST: CPT

## 2024-11-10 PROCEDURE — 83690 ASSAY OF LIPASE: CPT

## 2024-11-10 PROCEDURE — 99285 EMERGENCY DEPT VISIT HI MDM: CPT

## 2024-11-10 PROCEDURE — 83880 ASSAY OF NATRIURETIC PEPTIDE: CPT

## 2024-11-10 PROCEDURE — 85025 COMPLETE CBC W/AUTO DIFF WBC: CPT

## 2024-11-10 PROCEDURE — 80053 COMPREHEN METABOLIC PANEL: CPT

## 2024-11-10 PROCEDURE — 6360000004 HC RX CONTRAST MEDICATION: Performed by: NURSE PRACTITIONER

## 2024-11-10 PROCEDURE — 36415 COLL VENOUS BLD VENIPUNCTURE: CPT

## 2024-11-10 PROCEDURE — 93005 ELECTROCARDIOGRAM TRACING: CPT | Performed by: NURSE PRACTITIONER

## 2024-11-10 PROCEDURE — 84484 ASSAY OF TROPONIN QUANT: CPT

## 2024-11-10 RX ORDER — IOPAMIDOL 755 MG/ML
75 INJECTION, SOLUTION INTRAVASCULAR
Status: COMPLETED | OUTPATIENT
Start: 2024-11-10 | End: 2024-11-10

## 2024-11-10 RX ADMIN — IOPAMIDOL 75 ML: 755 INJECTION, SOLUTION INTRAVENOUS at 23:57

## 2024-11-10 ASSESSMENT — PAIN DESCRIPTION - LOCATION: LOCATION: ABDOMEN

## 2024-11-10 ASSESSMENT — LIFESTYLE VARIABLES
HOW OFTEN DO YOU HAVE A DRINK CONTAINING ALCOHOL: NEVER
HOW MANY STANDARD DRINKS CONTAINING ALCOHOL DO YOU HAVE ON A TYPICAL DAY: PATIENT DOES NOT DRINK

## 2024-11-10 ASSESSMENT — PAIN SCALES - GENERAL: PAINLEVEL_OUTOF10: 8

## 2024-11-10 ASSESSMENT — PAIN - FUNCTIONAL ASSESSMENT: PAIN_FUNCTIONAL_ASSESSMENT: 0-10

## 2024-11-10 ASSESSMENT — PAIN DESCRIPTION - ORIENTATION: ORIENTATION: MID

## 2024-11-11 VITALS
RESPIRATION RATE: 16 BRPM | TEMPERATURE: 98.7 F | HEART RATE: 82 BPM | DIASTOLIC BLOOD PRESSURE: 82 MMHG | BODY MASS INDEX: 26.45 KG/M2 | HEIGHT: 68 IN | SYSTOLIC BLOOD PRESSURE: 125 MMHG | WEIGHT: 174.5 LBS | OXYGEN SATURATION: 99 %

## 2024-11-11 LAB
EKG ATRIAL RATE: 90 BPM
EKG DIAGNOSIS: NORMAL
EKG P AXIS: 67 DEGREES
EKG P-R INTERVAL: 148 MS
EKG Q-T INTERVAL: 376 MS
EKG QRS DURATION: 80 MS
EKG QTC CALCULATION (BAZETT): 459 MS
EKG R AXIS: 50 DEGREES
EKG T AXIS: 186 DEGREES
EKG VENTRICULAR RATE: 90 BPM
NT-PROBNP SERPL-MCNC: 279 PG/ML (ref 0–124)

## 2024-11-11 PROCEDURE — 93010 ELECTROCARDIOGRAM REPORT: CPT | Performed by: INTERNAL MEDICINE

## 2024-11-11 PROCEDURE — 2580000003 HC RX 258: Performed by: EMERGENCY MEDICINE

## 2024-11-11 PROCEDURE — 6370000000 HC RX 637 (ALT 250 FOR IP): Performed by: EMERGENCY MEDICINE

## 2024-11-11 PROCEDURE — 96374 THER/PROPH/DIAG INJ IV PUSH: CPT

## 2024-11-11 PROCEDURE — 2500000003 HC RX 250 WO HCPCS: Performed by: EMERGENCY MEDICINE

## 2024-11-11 RX ORDER — DICYCLOMINE HYDROCHLORIDE 10 MG/1
20 CAPSULE ORAL ONCE
Status: COMPLETED | OUTPATIENT
Start: 2024-11-11 | End: 2024-11-11

## 2024-11-11 RX ORDER — DICYCLOMINE HYDROCHLORIDE 10 MG/1
10 CAPSULE ORAL
Qty: 20 CAPSULE | Refills: 0 | Status: SHIPPED | OUTPATIENT
Start: 2024-11-11

## 2024-11-11 RX ADMIN — DICYCLOMINE HYDROCHLORIDE 20 MG: 10 CAPSULE ORAL at 02:54

## 2024-11-11 RX ADMIN — FAMOTIDINE 20 MG: 10 INJECTION, SOLUTION INTRAVENOUS at 02:55

## 2024-11-11 NOTE — ED PROVIDER NOTES
[facility]  ED     EMERGENCY DEPARTMENT ENCOUNTER     Location: [facility]  ED  11/10/2024  Note Started: 6:48 AM EST 11/11/24      Patient Identification  Jessica Cornelius is a 54 y.o. female      HPI:Jessica Cornelius was evaluated in the Emergency Department for abdominal pain and bloating.  She also complains of diarrhea and vomiting.  Recent diagnosis with pancreatitis and C. difficile.  She is worried that she has C. difficile again. Although initial history and physical exam information was obtained by REBA/NPP/MD/DO (who also dictated a record of this visit), I personally saw the patient and performed and made/approved the management plan and take responsibility for the patient management.      PHYSICAL EXAM:  Nontoxic-appearing adult female in no acute distress, mild upper abdominal tenderness with no rebound rigidity or guarding.  Nonlabored respiration normal heart and lung sounds.    EKG Interpretation  Twelve-lead EKG as read and interpreted by myself shows normal sinus rhythm at a rate of 90 bpm, AK interval QRS QTc normal normal axis no acute ischemic changes.    Patient seen and evaluated.  Relevant records reviewed.  MDM: Adult female who comes in of abdominal pain nausea, vomiting and diarrhea.  Laboratory studies ordered as well as a CT scan of the abdomen pelvis.    Laboratory studies show leukocytosis.  She has normocytic anemia.  Thrombocytopenia.  She has mild hypocalcemia.  BNP is elevated but troponin is normal.    Patient was able to give a stool sample but the stool sample is formed and unlikely C. difficile.  We did send it to the lab but they refused to  perform the test which is reasonable.  The patient has had no further diarrheal episodes here.  CT scan does not show any colitis.    Patient is afebrile she is nontoxic-appearing.  Only minimal abdominal pain.  Patient is reassured.  Unlikely C. difficile.  Patient is given medications to help with the symptoms

## 2024-11-11 NOTE — ED PROVIDER NOTES
[facility]  ED  EMERGENCY DEPARTMENT ENCOUNTER        Pt Name: Jessica Cornelius  MRN: [id number]  Birthdate 1970  Date of evaluation: 11/10/2024  Provider: IRINA Valdivia - BERNADETTE  PCP: Endy Alarcon DO  Note Started: 11:51 PM EST 11/10/24       I have seen and evaluated this patient with my supervising physician Medina Clemens MD.      CHIEF COMPLAINT       Chief Complaint   Patient presents with    Diarrhea    Bloated    Abdominal Pain     States recent diagnosis with cdiff and pancreatitis. Thinks the cdiff is back        HISTORY OF PRESENT ILLNESS: 1 or more Elements     History From: the patient  Limitations to history : None    Jessica Cornelius is a 54 y.o. female who presents to the ER today with complaints of diarrhea, abdominal pain.  Patient states that she was recently diagnosed with C. difficile and pancreatitis, states that she thinks that she has significant, started with diarrhea today.  Patient is here for further evaluation.    Nursing Notes were all reviewed and agreed with or any disagreements were addressed in the HPI.    REVIEW OF SYSTEMS :      Review of Systems    Positives and Pertinent negatives as per HPI.     SURGICAL HISTORY     Past Surgical History:   Procedure Laterality Date    BUNIONECTOMY      CARDIAC PROCEDURE N/A 10/16/2024    Left heart cath / coronary angiography performed by Ira Calero MD at [facility] CARDIAC CATH LAB    CARPAL TUNNEL RELEASE      bilateral    CERVICAL FUSION      X3  NECK PROCEDURE LAST    PROCEDURE  TORN JUGULAR    CHOLECYSTECTOMY      CYST REMOVAL      left wrist x 2    HYSTERECTOMY (CERVIX STATUS UNKNOWN)      OTHER SURGICAL HISTORY Left 05/03/2017    Gastroc recession left    OTHER SURGICAL HISTORY Left 11/21/2017    3RD METATARSAL OSTEOTOMY, NEUROMA EXCISION SECON   THIRD    PARTIAL HYSTERECTOMY (CERVIX NOT REMOVED)      VA REMOVAL IMPLANT DEEP Left 09/26/2018    REMOVAL OF PAINFUL RETAINED HARDWARE LEFT FOOT performed  Infectious Disease Progress Note    Author: Karen Hemphill M.D. Date & Time of service: 2020  8:55 AM    Chief Complaint:  Follow-up for necrotizing LLE infection/cellulitis    Interval History:  63-year-old male with methamphetamine abuse, hepatitis C status post treatment, bilateral lower extremity venous stasis dermatitis with ulcerations admitted for above     AF WBC 5.8 platelets 183 Pain controlled. Tolerating abx. No new complaints   AF WBC 5.5 graft delayed due to continued undermining c/o nausea today-no specific abd pain-has generlized discomfort. No emesis or diarrhea   AF sleeping soundly at time of rounds-no acute events   AF sleeping soundly again today. Wound pictures reviewed.   5/15 afebrile WBC 5.6 patient complaining of ongoing nausea.  Otherwise no new symptoms to report   afebrile, WBC 6.7 I&D today. Continues to have nausea, thinks it is due to zyvox so he has not been taking abx for the past 2 days. Pt see with wound care team at bedside. Tendon still exposed but wound clean with no signs of infection per wound care RN. Surgery canceled today    Labs Reviewed and Medications Reviewed.    Review of Systems:  Review of Systems   Constitutional: Negative for chills and fever.   Respiratory: Negative for cough and shortness of breath.    Gastrointestinal: Positive for nausea. Negative for abdominal pain, diarrhea and vomiting.   Musculoskeletal: Positive for myalgias.   Neurological: Positive for sensory change. Negative for dizziness and headaches.   All other systems reviewed and are negative.      Hemodynamics:  Temp (24hrs), Av.7 °C (98.1 °F), Min:36.1 °C (97 °F), Max:37.2 °C (98.9 °F)  Temperature: (P) 37.2 °C (98.9 °F)  Pulse  Av.1  Min: 54  Max: 127   Blood Pressure: (P) 156/83       Physical Exam:  Physical Exam  Vitals signs and nursing note reviewed.   Constitutional:       General: He is not in acute distress.     Appearance: He is not  ill-appearing, toxic-appearing or diaphoretic.      Comments: disheveled   HENT:      Nose: No rhinorrhea.      Mouth/Throat:      Comments: Poor dentition  Eyes:      General:         Right eye: No discharge.         Left eye: No discharge.      Extraocular Movements: Extraocular movements intact.      Pupils: Pupils are equal, round, and reactive to light.   Cardiovascular:      Rate and Rhythm: Normal rate.   Pulmonary:      Effort: No respiratory distress.      Breath sounds: Normal breath sounds. No stridor. No wheezing or rhonchi.   Abdominal:      Palpations: Abdomen is soft.   Musculoskeletal:         General: Deformity present.      Comments: Left medial ulceration clean. Lateral wounds covered by wound vac   Skin:     General: Skin is warm.      Coloration: Skin is not jaundiced.      Findings: No rash.   Neurological:      Mental Status: He is alert.   Psychiatric:         Mood and Affect: Mood normal.         Behavior: Behavior normal.         Meds:    Current Facility-Administered Medications:   •  meropenem  •  losartan  •  linezolid  •  silver nitrate  •  amLODIPine  •  diphenhydrAMINE  •  senna-docusate  •  sertraline  •  polyethylene glycol/lytes  •  magnesium hydroxide  •  atorvastatin  •  ferrous gluconate  •  acetaminophen  •  ondansetron  •  ondansetron  •  promethazine  •  promethazine  •  prochlorperazine  •  hydrALAZINE  •  oxyCODONE immediate-release  •  morphine injection    Labs:  Recent Labs     05/16/20  1003 05/17/20  0253 05/18/20  0245   WBC 5.9 5.9 6.7   RBC 2.64* 2.58* 2.46*   HEMOGLOBIN 8.1* 7.9* 7.5*   HEMATOCRIT 25.1* 24.4* 22.9*   MCV 95.8 94.6 93.1   MCH 31.1 30.6 30.5   RDW 53.1* 51.6* 50.1*   PLATELETCT 144* 149* 168   MPV 10.0 9.2 9.5   NEUTSPOLYS 70.20 63.90 63.60   LYMPHOCYTES 12.20* 17.50* 15.10*   MONOCYTES 11.40 12.00 14.50*   EOSINOPHILS 3.70 4.10 4.80   BASOPHILS 1.00 0.80 0.70     Recent Labs     05/16/20  0110 05/17/20  0253 05/18/20  0245   SODIUM 140 134* 137    POTASSIUM 4.4 4.3 4.1   CHLORIDE 104 100 101   CO2 24 25 26   GLUCOSE 97 108* 105*   BUN 17 14 13     Recent Labs     05/16/20  0110 05/17/20  0253 05/18/20  0245   CREATININE 0.94 0.86 0.84       Imaging:  Ct-extremity, Lower With Left    Result Date: 4/21/2020 4/21/2020 5:16 PM HISTORY/REASON FOR EXAM:  Lower leg pain, suspected osteomyelitis. TECHNIQUE/EXAM DESCRIPTION AND NUMBER OF VIEWS:  CT scan of the LEFT lower extremity with contrast, with reconstructions. Thin helical 3 mm sections were obtained from the distal femur through the proximal tibia/fibula. Sagittal and coronal multiplanar reconstructions were generated from the axial images. A total of 100 mL of Omnipaque 350 nonionic contrast was administered  IV without complication. Up to date radiation dose reduction adjustments have been utilized to meet ALARA standards for radiation dose reduction. COMPARISON: 4/16/2020. FINDINGS: No acute fracture or dislocation is identified. Cellulitis of the lower leg, with soft tissue prominence and subcutaneous air. No subjacent cortical bone destruction to suggest osteomyelitis. No drainable abscess. There is chronic appearing valgus deviation of the foot at the subtalar joint, and there is suspected pes planovalgus. Moderate subtalar osteoarthrosis. Multifocal mild to moderate osteoarthrosis in the midfoot. Mild multifocal interphalangeal osteoarthrosis. Reactive popliteal lymphadenopathy.     1. No evidence of osteomyelitis. 2. Cellulitis and soft tissue organs of the left lower leg. Subcutaneous air without drainable abscess. 3. Suspected pes planovalgus deformity. Multifocal osteoarthrosis of the midfoot.    Ct-extremity, Lower With Left    Result Date: 4/16/2020 4/16/2020 1:45 PM HISTORY/REASON FOR EXAM:  Lower leg swelling/redness, cellulitis suspected. Left lower extremity edema and redness TECHNIQUE/EXAM DESCRIPTION AND NUMBER OF VIEWS:  CT scan of the LEFT lower extremity with contrast, with  reconstructions. Thin helical 3 mm sections were obtained from the distal femur through the proximal tibia/fibula. Sagittal and coronal multiplanar reconstructions were generated from the axial images. A total of 100 mL of Omnipaque 350 nonionic contrast was administered  IV without complication. Up to date radiation dose reduction adjustments have been utilized to meet ALARA standards for radiation dose reduction. COMPARISON: 12/26/2019 FINDINGS: There is diffuse subcutaneous edema in the lower leg. The overlying skin is markedly thickened. There is curvilinear fluid deep to the fascia, but superficial to the gastrocnemius muscle extending from the knee joint to the ankle. Edema extends into the deeper fascial planes. No soft tissue gas is identified. No definite muscle necrosis is identified. No thrombus is identified. The Achilles tendon is intact. The bones are osteopenic. No cortical destruction is identified.     1.  Extensive subcutaneous edema with overlying skin thickening. Inflammation extends along the fascial planes with curvilinear fluid superficial to the gastrocnemius muscle. No soft tissue gas to suggest necrotizing fasciitis. 2.  Osteopenia    Us-renal    Result Date: 4/19/2020 4/19/2020 10:53 AM HISTORY/REASON FOR EXAM:  Acute renal failure TECHNIQUE/EXAM DESCRIPTION: Renal ultrasound. COMPARISON:  08/15/2018 FINDINGS: The right kidney measures 8.11 cm. The left kidney measures 9.17 cm. There is no hydronephrosis. There are no abnormal calcifications. Small right pleural effusion is identified. The bladder demonstrates no focal wall abnormality.     1.  Right kidney appears smaller than left kidney which could be due to atrophy. 2.  No hydronephrosis. 3.  Incidentally noted small right pleural effusion.      Micro:  Results     Procedure Component Value Units Date/Time    SARS-CoV-2, PCR (In-House) [[id number]] Collected:  05/17/20 0383    Order Status:  Completed Updated:  05/17/20 6630      SARS-CoV-2 Source NP Swab     SARS-CoV-2 by PCR NotDetected     Comment: [facility] providers: PLEASE REFER TO DE-ESCALATION AND RETESTING PROTOCOL  on [email or web address]  **The "" GeneXpert Xpress SARS-CoV-2 Test has been made available for  use under the Emergency Use Authorization (EUA) only.         Narrative:       [id number] LUISA WU    COVID/SARS CoV-2 [[id number]] Collected:  05/17/20 1741    Order Status:  Completed Specimen:  Respirate from Nasopharyngeal Updated:  05/17/20 1753     COVID Order Status Received    Narrative:       [id number] LUISA WU          Assessment/Plan:  Left lower extremity cellulitis, complicated. Slow to heal but overall improved  Chronic venous stasis   Afebrile  Resolved leukocytosis  CT LLE on 4/21 with no OM or drainable abscess  s/p I&D on 4/22 down to muscle  S/p repeat I and D on 4/24 of mult compartments and placement of wound VAC: skin at the margins necrotic  S/p repeat I&D and WV change on 4/27 by Dr. John  Blood culture negative  Wound cx 4/16 +MRSA and GAS  Wound cultures on 4/22 +PSAR and GAS   Repeat I&D on 5/4/2020-no skin graft due to necrosis  I&D with skin graft canceled today as tendon continues to be exposed  Wound vac changed today. Wound care photos reviewed - clean without signs of infection.  Discontinue linezolid and meropenem as wounds are clean without signs of infection. Pt also having nausea with linezolid.  Pt has been on a long course of abx since 4/16/2020.  Continue to monitor for signs of infection  Continue wound vac/care    Nausea, persistent  Eating  Normal LFTs and lipase  ? 2/2 linezolid. DC linezolid today    Thrombocytopenia, resolved.  No active bleeding    Methamphetamine abuse  Not a candidate for OPIC    Hepatitis C   status post treatment  No additional treatment at this time    LUPE, resolved  Avoiding nephrotoxic agents if possible    Prognosis for limb salvage guarded     Plan of care discussed with hospitalist  Dr. Burnett and wound care RN. Will sign off. Please reconsult if needed.

## 2024-11-12 DIAGNOSIS — R19.7 DIARRHEA, UNSPECIFIED TYPE: Primary | ICD-10-CM

## 2024-11-12 DIAGNOSIS — R10.13 EPIGASTRIC PAIN: Primary | ICD-10-CM

## 2024-11-12 RX ORDER — DICYCLOMINE HCL 20 MG
20 TABLET ORAL 4 TIMES DAILY
Qty: 90 TABLET | Refills: 5 | Status: SHIPPED | OUTPATIENT
Start: 2024-11-12

## 2024-11-17 PROBLEM — R79.89 ELEVATED TROPONIN: Status: RESOLVED | Noted: 2024-10-18 | Resolved: 2024-11-17

## 2024-11-20 RX ORDER — PANTOPRAZOLE SODIUM 40 MG/1
40 TABLET, DELAYED RELEASE ORAL DAILY
Qty: 90 TABLET | Refills: 0 | Status: SHIPPED | OUTPATIENT
Start: 2024-11-20

## 2024-12-05 ENCOUNTER — OFFICE VISIT (OUTPATIENT)
Dept: FAMILY MEDICINE CLINIC | Age: 54
End: 2024-12-05
Payer: COMMERCIAL

## 2024-12-05 ENCOUNTER — PATIENT MESSAGE (OUTPATIENT)
Dept: FAMILY MEDICINE CLINIC | Age: 54
End: 2024-12-05

## 2024-12-05 VITALS
WEIGHT: 178.2 LBS | SYSTOLIC BLOOD PRESSURE: 124 MMHG | OXYGEN SATURATION: 99 % | HEIGHT: 68 IN | BODY MASS INDEX: 27.01 KG/M2 | DIASTOLIC BLOOD PRESSURE: 80 MMHG | HEART RATE: 83 BPM

## 2024-12-05 DIAGNOSIS — H66.001 NON-RECURRENT ACUTE SUPPURATIVE OTITIS MEDIA OF RIGHT EAR WITHOUT SPONTANEOUS RUPTURE OF TYMPANIC MEMBRANE: Primary | ICD-10-CM

## 2024-12-05 PROCEDURE — 3074F SYST BP LT 130 MM HG: CPT | Performed by: NURSE PRACTITIONER

## 2024-12-05 PROCEDURE — 3079F DIAST BP 80-89 MM HG: CPT | Performed by: NURSE PRACTITIONER

## 2024-12-05 PROCEDURE — 99213 OFFICE O/P EST LOW 20 MIN: CPT | Performed by: NURSE PRACTITIONER

## 2024-12-05 RX ORDER — PREDNISONE 20 MG/1
20 TABLET ORAL DAILY
Qty: 5 TABLET | Refills: 0 | Status: SHIPPED | OUTPATIENT
Start: 2024-12-05 | End: 2024-12-10

## 2024-12-05 RX ORDER — DEXTROMETHORPHAN HYDROBROMIDE AND PROMETHAZINE HYDROCHLORIDE 15; 6.25 MG/5ML; MG/5ML
5 SYRUP ORAL 4 TIMES DAILY PRN
Qty: 118 ML | Refills: 0 | Status: SHIPPED | OUTPATIENT
Start: 2024-12-05 | End: 2024-12-12

## 2024-12-05 NOTE — PROGRESS NOTES
PROGRESS NOTE  Date of Service:  12/5/2024    SUBJECTIVE:  Patient ID: Jessica Cornelius is a 54 y.o. female    HPI: new patient exam  Presents for co ear pain and cough and congestion that has been present for 2 weeks   Previous use of antibiotic   Past Medical History:   Diagnosis Date    ADHD (attention deficit hyperactivity disorder)     Anxiety     Arthritis     Depression     Elbow injury     CASTED     Fibromyalgia     Fractures     ELBOW AND WRIST    Headache     Hypertension     Irritable bowel syndrome     Wrist fracture     AS CHILD      Past Surgical History:   Procedure Laterality Date    BUNIONECTOMY      CARDIAC PROCEDURE N/A 10/16/2024    Left heart cath / coronary angiography performed by Ira Calero MD at [facility] CARDIAC CATH LAB    CARPAL TUNNEL RELEASE      bilateral    CERVICAL FUSION      X3  NECK PROCEDURE LAST    PROCEDURE  TORN JUGULAR    CHOLECYSTECTOMY      CYST REMOVAL      left wrist x 2    HYSTERECTOMY (CERVIX STATUS UNKNOWN)      OTHER SURGICAL HISTORY Left 05/03/2017    Gastroc recession left    OTHER SURGICAL HISTORY Left 11/21/2017    3RD METATARSAL OSTEOTOMY, NEUROMA EXCISION SECON   THIRD    PARTIAL HYSTERECTOMY (CERVIX NOT REMOVED)      IN REMOVAL IMPLANT DEEP Left 09/26/2018    REMOVAL OF PAINFUL RETAINED HARDWARE LEFT FOOT performed by Cem Jeffrey DPM at [facility] OR    SALIVARY GLAND SURGERY      removal of benign cyst      Social History     Tobacco Use    Smoking status: Never    Smokeless tobacco: Never    Tobacco comments:     Nicotine gum   Substance Use Topics    Alcohol use: No      Family History   Problem Relation Age of Onset    High Blood Pressure Mother     No Known Problems Father     No Known Problems Brother     Breast Cancer Paternal Grandmother     Breast Cancer Paternal Aunt      Current Outpatient Medications on File Prior to Visit   Medication Sig Dispense Refill    pantoprazole (PROTONIX) 40 MG tablet Take 1 tablet by mouth daily 90 tablet

## 2024-12-05 NOTE — ASSESSMENT & PLAN NOTE
Will treat with steroids if the symptoms do not improve or worsen next step may be antibiotics   Based on previous use of antibiotics this will not be initiated until first steps are used for treatment and symptom relief

## 2024-12-09 RX ORDER — FLUOXETINE 40 MG/1
40 CAPSULE ORAL DAILY
Qty: 30 CAPSULE | Refills: 2 | Status: SHIPPED | OUTPATIENT
Start: 2024-12-09

## 2024-12-09 RX ORDER — METOPROLOL SUCCINATE 25 MG/1
25 TABLET, EXTENDED RELEASE ORAL 2 TIMES DAILY
Qty: 30 TABLET | Refills: 3 | Status: SHIPPED | OUTPATIENT
Start: 2024-12-09

## 2024-12-09 RX ORDER — GABAPENTIN 800 MG/1
800 TABLET ORAL 3 TIMES DAILY
Qty: 90 TABLET | Refills: 2 | Status: SHIPPED | OUTPATIENT
Start: 2024-12-09 | End: 2025-03-09

## 2024-12-09 RX ORDER — FLUOXETINE 40 MG/1
40 CAPSULE ORAL DAILY
Qty: 30 CAPSULE | Refills: 2 | OUTPATIENT
Start: 2024-12-09

## 2024-12-09 RX ORDER — METOPROLOL SUCCINATE 25 MG/1
TABLET, EXTENDED RELEASE ORAL
Qty: 60 TABLET | OUTPATIENT
Start: 2024-12-09

## 2024-12-20 DIAGNOSIS — R10.13 EPIGASTRIC PAIN: Primary | ICD-10-CM

## 2024-12-20 DIAGNOSIS — I51.81 TAKOTSUBO CARDIOMYOPATHY: ICD-10-CM

## 2024-12-23 DIAGNOSIS — I51.81 TAKOTSUBO CARDIOMYOPATHY: ICD-10-CM

## 2024-12-23 DIAGNOSIS — R10.13 EPIGASTRIC PAIN: ICD-10-CM

## 2024-12-23 LAB
ALBUMIN SERPL-MCNC: 3.5 G/DL (ref 3.4–5)
ALBUMIN/GLOB SERPL: 1.8 {RATIO} (ref 1.1–2.2)
ALP SERPL-CCNC: 85 U/L (ref 40–129)
ALT SERPL-CCNC: 21 U/L (ref 10–40)
ANION GAP SERPL CALCULATED.3IONS-SCNC: 9 MMOL/L (ref 3–16)
AST SERPL-CCNC: 18 U/L (ref 15–37)
BASOPHILS # BLD: 0.1 K/UL (ref 0–0.2)
BASOPHILS NFR BLD: 1.3 %
BILIRUB SERPL-MCNC: <0.2 MG/DL (ref 0–1)
BUN SERPL-MCNC: 20 MG/DL (ref 7–20)
CALCIUM SERPL-MCNC: 9.2 MG/DL (ref 8.3–10.6)
CHLORIDE SERPL-SCNC: 104 MMOL/L (ref 99–110)
CO2 SERPL-SCNC: 27 MMOL/L (ref 21–32)
CREAT SERPL-MCNC: 0.8 MG/DL (ref 0.6–1.1)
DEPRECATED RDW RBC AUTO: 14.7 % (ref 12.4–15.4)
EOSINOPHIL # BLD: 0.2 K/UL (ref 0–0.6)
EOSINOPHIL NFR BLD: 2.3 %
GFR SERPLBLD CREATININE-BSD FMLA CKD-EPI: 87 ML/MIN/{1.73_M2}
GLUCOSE SERPL-MCNC: 85 MG/DL (ref 70–99)
HCT VFR BLD AUTO: 38.8 % (ref 36–48)
HGB BLD-MCNC: 12.9 G/DL (ref 12–16)
LIPASE SERPL-CCNC: 195 U/L (ref 13–60)
LYMPHOCYTES # BLD: 1.5 K/UL (ref 1–5.1)
LYMPHOCYTES NFR BLD: 20 %
MCH RBC QN AUTO: 31.9 PG (ref 26–34)
MCHC RBC AUTO-ENTMCNC: 33.3 G/DL (ref 31–36)
MCV RBC AUTO: 95.9 FL (ref 80–100)
MONOCYTES # BLD: 0.3 K/UL (ref 0–1.3)
MONOCYTES NFR BLD: 4.7 %
NEUTROPHILS # BLD: 5.3 K/UL (ref 1.7–7.7)
NEUTROPHILS NFR BLD: 71.7 %
NT-PROBNP SERPL-MCNC: 99 PG/ML (ref 0–124)
PLATELET # BLD AUTO: 283 K/UL (ref 135–450)
PMV BLD AUTO: 9.2 FL (ref 5–10.5)
POTASSIUM SERPL-SCNC: 4.5 MMOL/L (ref 3.5–5.1)
PROT SERPL-MCNC: 5.4 G/DL (ref 6.4–8.2)
RBC # BLD AUTO: 4.05 M/UL (ref 4–5.2)
SODIUM SERPL-SCNC: 140 MMOL/L (ref 136–145)
TSH SERPL DL<=0.005 MIU/L-ACNC: 2.67 UIU/ML (ref 0.27–4.2)
WBC # BLD AUTO: 7.4 K/UL (ref 4–11)

## 2024-12-23 RX ORDER — METOPROLOL SUCCINATE 25 MG/1
TABLET, EXTENDED RELEASE ORAL
Qty: 180 TABLET | Refills: 1 | Status: SHIPPED | OUTPATIENT
Start: 2024-12-23

## 2024-12-24 ENCOUNTER — TELEMEDICINE (OUTPATIENT)
Dept: FAMILY MEDICINE CLINIC | Age: 54
End: 2024-12-24
Payer: COMMERCIAL

## 2024-12-24 DIAGNOSIS — I51.81 TAKOTSUBO CARDIOMYOPATHY: ICD-10-CM

## 2024-12-24 DIAGNOSIS — R10.13 EPIGASTRIC PAIN: Primary | ICD-10-CM

## 2024-12-24 DIAGNOSIS — K85.30 DRUG-INDUCED ACUTE PANCREATITIS, UNSPECIFIED COMPLICATION STATUS: ICD-10-CM

## 2024-12-24 PROCEDURE — 99214 OFFICE O/P EST MOD 30 MIN: CPT | Performed by: STUDENT IN AN ORGANIZED HEALTH CARE EDUCATION/TRAINING PROGRAM

## 2024-12-24 NOTE — PROGRESS NOTES
[facility]  2024    Jessica Cornelius (:  1970) is a 54 y.o. female, asked for a virtual visit due to recent NSTEMI, Takotsubo cardiomyopathy, epigastric pain, and discomfort when leaving the house.  Seen by telehealth over epic for evaluation of the following medical concerns:    No chief complaint on file.       ASSESSMENT/ PLAN  Assessment & Plan  Epigastric pain  Secondary to pancreatitis likely secondary to orlistat.  Chronic, worsening (exacerbation), stop orlistat.  Water today, clear liquids tomorrow, if pain is staying gone or improved can try eating.  If it anytime pain worsens she promises to go to the emergency room.  Hopefully this will allow her to spend the holiday at home.  Recheck lipase on Thursday.  30 minutes was spent reviewing labs, chart review, discussing treatment options with the patient.         Takotsubo cardiomyopathy   Chronic, at goal (stable), continue current treatment plan         Drug-induced acute pancreatitis, unspecified complication status   New, not at goal (unstable), see plan for epigastric pain.  Needs complete rest of the pancreas.  She will do water today and advance to clear liquids tomorrow if pain is improving.  She can then advance her diet to trying to eat the next day if pain remains improved.  If it anytime pain worsens she promises to go to the emergency room.  Will hold off on anything for pain control to prevent masking worsening pancreatitis.              No follow-ups on file.    HPI  Jessica Cornelius is still having 4 out of 10 epigastric pain.  If anything hits her abdomen she reports pain goes to 6 out of 10.  She denies any nausea, vomiting, chest pain, shortness of breath, peripheral edema.  She has had diarrhea.  She started taking orlistat due to her appetite being voracious since coming home from the hospital and not being able to start on Ozempic due to epigastric pain and concern for developing pancreatitis, ileus,

## 2024-12-26 ENCOUNTER — TELEPHONE (OUTPATIENT)
Dept: FAMILY MEDICINE CLINIC | Age: 54
End: 2024-12-26

## 2024-12-26 DIAGNOSIS — R74.8 ELEVATED LIPASE: ICD-10-CM

## 2024-12-26 DIAGNOSIS — R74.8 ELEVATED LIPASE: Primary | ICD-10-CM

## 2024-12-26 LAB — LIPASE SERPL-CCNC: 40 U/L (ref 13–60)

## 2024-12-30 DIAGNOSIS — R74.8 ELEVATED LIPASE: Primary | ICD-10-CM

## 2024-12-30 DIAGNOSIS — R10.13 EPIGASTRIC PAIN: ICD-10-CM

## 2025-01-06 RX ORDER — FLUOXETINE 40 MG/1
40 CAPSULE ORAL DAILY
Qty: 30 CAPSULE | Refills: 2 | OUTPATIENT
Start: 2025-01-06

## 2025-01-07 RX ORDER — SEMAGLUTIDE 2.68 MG/ML
2 INJECTION, SOLUTION SUBCUTANEOUS
Qty: 3 ML | Refills: 2 | Status: SHIPPED | OUTPATIENT
Start: 2025-01-07

## 2025-01-08 ENCOUNTER — TELEPHONE (OUTPATIENT)
Dept: ADMINISTRATIVE | Age: 55
End: 2025-01-08

## 2025-01-08 NOTE — TELEPHONE ENCOUNTER
DENIAL for Ozempic (2 MG/DOSE) 8MG/3ML pen-injectors; letter attached in this encounter and in Media.    General Denial Reasoning: Drug is not covered for off label usage. This drug is FDA approved for Type 2 Diabetes.    Note :  If you want an APPEAL; please note in this encounter what new information you would like to APPEAL with. Once complete route back to PA POOL.  If this requires a response please respond to the pool ( P MHCX PSC MEDICATION PREAUTH).  Thank you please advise patient.

## 2025-01-13 ENCOUNTER — OFFICE VISIT (OUTPATIENT)
Dept: CARDIOLOGY CLINIC | Age: 55
End: 2025-01-13
Payer: COMMERCIAL

## 2025-01-13 VITALS
DIASTOLIC BLOOD PRESSURE: 82 MMHG | RESPIRATION RATE: 14 BRPM | TEMPERATURE: 96.4 F | SYSTOLIC BLOOD PRESSURE: 136 MMHG | WEIGHT: 177 LBS | HEIGHT: 68 IN | OXYGEN SATURATION: 98 % | HEART RATE: 64 BPM | BODY MASS INDEX: 26.83 KG/M2

## 2025-01-13 DIAGNOSIS — I77.810 DILATED AORTIC ROOT (HCC): ICD-10-CM

## 2025-01-13 DIAGNOSIS — I51.81 TAKOTSUBO CARDIOMYOPATHY: ICD-10-CM

## 2025-01-13 DIAGNOSIS — I42.0 DILATED CARDIOMYOPATHY (HCC): ICD-10-CM

## 2025-01-13 DIAGNOSIS — I10 PRIMARY HYPERTENSION: Primary | ICD-10-CM

## 2025-01-13 DIAGNOSIS — I77.810 ASCENDING AORTA DILATATION (HCC): ICD-10-CM

## 2025-01-13 PROCEDURE — 3079F DIAST BP 80-89 MM HG: CPT | Performed by: NURSE PRACTITIONER

## 2025-01-13 PROCEDURE — 3075F SYST BP GE 130 - 139MM HG: CPT | Performed by: NURSE PRACTITIONER

## 2025-01-13 PROCEDURE — 99214 OFFICE O/P EST MOD 30 MIN: CPT | Performed by: NURSE PRACTITIONER

## 2025-01-13 RX ORDER — MULTIVITAMIN WITH IRON
1 TABLET ORAL DAILY
COMMUNITY

## 2025-01-13 RX ORDER — METOPROLOL SUCCINATE 25 MG/1
25 TABLET, EXTENDED RELEASE ORAL 2 TIMES DAILY
Qty: 180 TABLET | Refills: 1 | Status: SHIPPED | OUTPATIENT
Start: 2025-01-13

## 2025-01-13 NOTE — PROGRESS NOTES
appreciate the opportunity of cooperating in the care of this individual.    Reina De La O, APRN - CNP, 1/13/2025, 11:28 AM

## 2025-01-24 ENCOUNTER — OFFICE VISIT (OUTPATIENT)
Dept: FAMILY MEDICINE CLINIC | Age: 55
End: 2025-01-24
Payer: COMMERCIAL

## 2025-01-24 VITALS
HEART RATE: 68 BPM | OXYGEN SATURATION: 100 % | SYSTOLIC BLOOD PRESSURE: 132 MMHG | DIASTOLIC BLOOD PRESSURE: 86 MMHG | BODY MASS INDEX: 27.37 KG/M2 | WEIGHT: 180 LBS

## 2025-01-24 DIAGNOSIS — R05.1 ACUTE COUGH: Primary | ICD-10-CM

## 2025-01-24 DIAGNOSIS — R09.82 POST-NASAL DRIP: ICD-10-CM

## 2025-01-24 DIAGNOSIS — B96.89 ACUTE BACTERIAL SINUSITIS: ICD-10-CM

## 2025-01-24 DIAGNOSIS — J34.89 SINUS PRESSURE: ICD-10-CM

## 2025-01-24 DIAGNOSIS — J01.90 ACUTE BACTERIAL SINUSITIS: ICD-10-CM

## 2025-01-24 DIAGNOSIS — H92.01 RIGHT EAR PAIN: ICD-10-CM

## 2025-01-24 PROCEDURE — 99213 OFFICE O/P EST LOW 20 MIN: CPT | Performed by: STUDENT IN AN ORGANIZED HEALTH CARE EDUCATION/TRAINING PROGRAM

## 2025-01-24 PROCEDURE — 3079F DIAST BP 80-89 MM HG: CPT | Performed by: STUDENT IN AN ORGANIZED HEALTH CARE EDUCATION/TRAINING PROGRAM

## 2025-01-24 PROCEDURE — 3075F SYST BP GE 130 - 139MM HG: CPT | Performed by: STUDENT IN AN ORGANIZED HEALTH CARE EDUCATION/TRAINING PROGRAM

## 2025-01-24 RX ORDER — OXYMETAZOLINE HYDROCHLORIDE 0.05 G/100ML
SPRAY NASAL
Qty: 15 ML | Refills: 0 | Status: SHIPPED | OUTPATIENT
Start: 2025-01-24

## 2025-01-24 RX ORDER — GUAIFENESIN/DEXTROMETHORPHAN 100-10MG/5
5 SYRUP ORAL 3 TIMES DAILY PRN
Qty: 120 ML | Refills: 0 | Status: SHIPPED | OUTPATIENT
Start: 2025-01-24 | End: 2025-01-24

## 2025-01-24 RX ORDER — DEXTROMETHORPHAN HYDROBROMIDE AND PROMETHAZINE HYDROCHLORIDE 15; 6.25 MG/5ML; MG/5ML
5 SYRUP ORAL 4 TIMES DAILY PRN
Qty: 120 ML | Refills: 0 | Status: SHIPPED | OUTPATIENT
Start: 2025-01-24 | End: 2025-01-31

## 2025-01-24 ASSESSMENT — ENCOUNTER SYMPTOMS
SINUS PRESSURE: 1
COUGH: 1
ABDOMINAL PAIN: 0
RHINORRHEA: 1
CONSTIPATION: 0
BLOOD IN STOOL: 0
SHORTNESS OF BREATH: 0

## 2025-01-24 NOTE — PROGRESS NOTES
[facility] Medicine  2025    Jessica Cornelius (:  1970) is a 54 y.o. female, here for evaluation of the following medical concerns:    Chief Complaint   Patient presents with    Cough    Congestion     Patient is here for cough and congestion. No fever. Ear pain.        ASSESSMENT/ PLAN  Assessment & Plan  Acute cough   New, not at goal (unstable), likely 2/2 post nasal drip.  Lung CTAB.  Robitussin DM         Post-nasal drip   New, not at goal (unstable), start Afrin.  Guaifenesin DM.  Vitamin C, vitamin D, zinc, fluids, rest may be beneficial.  14 days with continued increasing symptoms and increasing maxillary sinus pressure.  Likely bacterial sinusitis at this point.  Will treat with Augmentin.  Follow-up for new or worsening symptoms.         Sinus pressure   New, not at goal (unstable), see plan above         Right ear pain   New, not at goal (unstable), start Flonase         Acute bacterial sinusitis   New, not at goal (unstable), start Augmentin follow-up for new or worsening symptoms              No follow-ups on file.    HPI  Jessica Cornelius is here due to 2 weeks of increasing sinus pressure, rhinorrhea, right ear pain, hearing sounding muffled.  No ear discharge.  Cough is productive of clear sputum.  Does not feel like it is coming from her lungs.  No shortness of breath or chest pain.  No peripheral edema.  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    ROS  Review of Systems   Constitutional:  Negative for chills and fever.   HENT:  Positive for ear pain, hearing loss, rhinorrhea, sinus pressure and tinnitus (baseline). Negative for ear discharge.    Respiratory:  Positive for cough. Negative for shortness of breath.    Cardiovascular:  Negative for chest pain.   Gastrointestinal:  Negative for abdominal pain, blood in stool and constipation.       HISTORIES  Current Outpatient Medications on File Prior to Visit   Medication Sig Dispense Refill    Multiple

## 2025-01-27 RX ORDER — BENZONATATE 100 MG/1
100-200 CAPSULE ORAL 3 TIMES DAILY PRN
Qty: 30 CAPSULE | Refills: 0 | Status: SHIPPED | OUTPATIENT
Start: 2025-01-27 | End: 2025-02-03

## 2025-02-12 ENCOUNTER — APPOINTMENT (OUTPATIENT)
Dept: GENERAL RADIOLOGY | Age: 55
End: 2025-02-12
Payer: COMMERCIAL

## 2025-02-12 ENCOUNTER — HOSPITAL ENCOUNTER (EMERGENCY)
Age: 55
Discharge: HOME | End: 2025-02-12
Payer: COMMERCIAL

## 2025-02-12 ENCOUNTER — APPOINTMENT (OUTPATIENT)
Dept: CT IMAGING | Age: 55
End: 2025-02-12
Payer: COMMERCIAL

## 2025-02-12 VITALS
TEMPERATURE: 98.1 F | HEART RATE: 72 BPM | RESPIRATION RATE: 14 BRPM | WEIGHT: 174.6 LBS | BODY MASS INDEX: 26.55 KG/M2 | DIASTOLIC BLOOD PRESSURE: 94 MMHG | OXYGEN SATURATION: 97 % | SYSTOLIC BLOOD PRESSURE: 151 MMHG

## 2025-02-12 DIAGNOSIS — S50.01XA CONTUSION OF RIGHT ELBOW, INITIAL ENCOUNTER: ICD-10-CM

## 2025-02-12 DIAGNOSIS — S16.1XXA STRAIN OF NECK MUSCLE, INITIAL ENCOUNTER: ICD-10-CM

## 2025-02-12 DIAGNOSIS — S60.221A CONTUSION OF RIGHT HAND, INITIAL ENCOUNTER: ICD-10-CM
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
## 2025-02-12 PROCEDURE — 73130 X-RAY EXAM OF HAND: CPT

## 2025-02-12 PROCEDURE — 99284 EMERGENCY DEPT VISIT MOD MDM: CPT

## 2025-02-12 PROCEDURE — 73080 X-RAY EXAM OF ELBOW: CPT

## 2025-02-12 PROCEDURE — 72125 CT NECK SPINE W/O DYE: CPT

## 2025-02-12 RX ORDER — NAPROXEN 500 MG/1
500 TABLET ORAL 2 TIMES DAILY PRN
Qty: 30 TABLET | Refills: 0 | Status: SHIPPED | OUTPATIENT
Start: 2025-02-12

## 2025-02-12 RX ORDER — HYDROCODONE BITARTRATE AND ACETAMINOPHEN 5; 325 MG/1; MG/1
1 TABLET ORAL EVERY 6 HOURS PRN
Qty: 12 TABLET | Refills: 0 | Status: SHIPPED | OUTPATIENT
Start: 2025-02-12 | End: 2025-02-15

## 2025-02-12 RX ORDER — CYCLOBENZAPRINE HCL 10 MG
10 TABLET ORAL 3 TIMES DAILY PRN
Qty: 30 TABLET | Refills: 0 | Status: SHIPPED | OUTPATIENT
Start: 2025-02-12 | End: 2025-02-22

## 2025-02-12 ASSESSMENT — PAIN SCALES - GENERAL
PAINLEVEL_OUTOF10: 5
PAINLEVEL_OUTOF10: 8

## 2025-02-12 ASSESSMENT — PAIN - FUNCTIONAL ASSESSMENT
PAIN_FUNCTIONAL_ASSESSMENT: 0-10
PAIN_FUNCTIONAL_ASSESSMENT: 0-10

## 2025-02-12 ASSESSMENT — PAIN DESCRIPTION - LOCATION: LOCATION: HEAD

## 2025-02-13 NOTE — ED PROVIDER NOTES
[facility] EMERGENCY DEPARTMENT  Emergency Department Encounter    Patient Name: Jessica Cornelius  MRN: [id number]  YOB: 1970  Date of Evaluation: 2/12/2025  Provider: Endy Alarcon DO  Note Started: 10:35 PM EST 2/12/25    CHIEF COMPLAINT  Fall (Slip and fall at work no loc, right arm and hand hand pain, neck pain as well didn't hit head, )    SHARED SERVICE VISIT  REBA. I have evaluated this patient.      HISTORY OF PRESENT ILLNESS  History From: Patient.    Limitations to history : None    Jessica Cornelius is a 54 y.o. female with history of previous cervical spine surgery who presents to the ED for evaluation of fall onset today prior to arrival.  Patient states that she works as a schoolteacher and was walking through the parking lot this morning when she slipped on wet pavement and fell striking her right hand as well as her right elbow.  Patient denies any head injury or loss of consciousness.  Patient denies any blood thinner use.  Patient states that since the fall she has had neck pain primarily at the base of her skull and in her lower neck.  Patient denies any difficulty with range of motion or numbness or tingling radiating into the arms.  Denies any focal neurological weakness, slurred speech, and facial droop, vision changes, and dizziness.  Patient states that she has been ambulatory since the fall without difficulty.  Denies any chest pain, abdominal pain, shortness of breath.    No other complaints, modifying factors or associated symptoms.     Nursing notes reviewed were all reviewed and agreed with or any disagreements were addressed in the HPI.    PMH:  Past Medical History:   Diagnosis Date    Abnormal ECG 10/2024    ADHD (attention deficit hyperactivity disorder)     Anxiety     Arthritis     Depression     Elbow injury     CASTED     Fibromyalgia     Fractures     ELBOW AND WRIST    Headache     Hypertension     Irritable bowel syndrome     Wrist fracture     AS

## 2025-03-10 RX ORDER — FLUOXETINE HYDROCHLORIDE 40 MG/1
40 CAPSULE ORAL DAILY
Qty: 30 CAPSULE | Refills: 2 | OUTPATIENT
Start: 2025-03-10

## 2025-06-02 RX ORDER — GABAPENTIN 800 MG/1
800 TABLET ORAL 3 TIMES DAILY
Qty: 90 TABLET | Refills: 2 | OUTPATIENT
Start: 2025-06-02

## (undated) DEVICE — SUTURE COAT VCRL SZ 4-0 L18IN ABSRB UD L19MM PS-2 1/2 CIR J496G

## (undated) DEVICE — TR BAND RADIAL ARTERY COMPRESSION DEVICE: Brand: TR BAND

## (undated) DEVICE — NEEDLE HYPO 18GA L1.5IN PNK POLYPR HUB S STL THN WALL FILL

## (undated) DEVICE — 1010 S-DRAPE TOWEL DRAPE 10/BX: Brand: STERI-DRAPE™

## (undated) DEVICE — NEEDLE HYPO 25GA L1.5IN BLU POLYPR HUB S STL REG BVL STR

## (undated) DEVICE — COTTON UNDERCAST PADDING,CRIMPED FINISH: Brand: WEBRIL

## (undated) DEVICE — GUIDEWIRE VASC L150CM DIA0.035IN FLX END L7CM J 3MM PTFE

## (undated) DEVICE — APEX: Brand: DEROYAL

## (undated) DEVICE — RADIFOCUS OPTITORQUE ANGIOGRAPHIC CATHETER: Brand: OPTITORQUE

## (undated) DEVICE — RADPAD

## (undated) DEVICE — Device

## (undated) DEVICE — BANDAGE COMPR W3INXL5YD TAN BRTH SELF ADH WRP W/ HND TEAR

## (undated) DEVICE — CHLORAPREP 26ML ORANGE

## (undated) DEVICE — CATH LAB PACK: Brand: MEDLINE INDUSTRIES, INC.

## (undated) DEVICE — GLIDESHEATH SLENDER STAINLESS STEEL KIT: Brand: GLIDESHEATH SLENDER

## (undated) DEVICE — SYRINGE MED 10ML LUERLOCK TIP W/O SFTY DISP